# Patient Record
Sex: FEMALE | Race: BLACK OR AFRICAN AMERICAN
[De-identification: names, ages, dates, MRNs, and addresses within clinical notes are randomized per-mention and may not be internally consistent; named-entity substitution may affect disease eponyms.]

---

## 2017-01-16 ENCOUNTER — HOSPITAL ENCOUNTER (EMERGENCY)
Dept: HOSPITAL 19 - COL.ER | Age: 57
LOS: 1 days | Discharge: HOME | End: 2017-01-17
Payer: OTHER GOVERNMENT

## 2017-01-16 VITALS — BODY MASS INDEX: 27.39 KG/M2 | WEIGHT: 170.42 LBS | HEIGHT: 65.98 IN

## 2017-01-16 VITALS — DIASTOLIC BLOOD PRESSURE: 108 MMHG | TEMPERATURE: 98.1 F | SYSTOLIC BLOOD PRESSURE: 156 MMHG

## 2017-01-16 DIAGNOSIS — K52.9: Primary | ICD-10-CM

## 2017-01-16 LAB
ADJUSTED CALCIUM: 9.6 MG/DL (ref 8.4–10.2)
ALBUMIN SERPL-MCNC: 4.6 GM/DL (ref 3.5–5)
ALP SERPL-CCNC: 77 U/L (ref 50–136)
ALT SERPL-CCNC: 34 U/L (ref 9–52)
ANION GAP SERPL CALC-SCNC: 12 MMOL/L (ref 7–16)
BASOPHILS # BLD: 0 10*3/UL (ref 0–0.2)
BASOPHILS NFR BLD AUTO: 0.2 % (ref 0–2)
BILIRUB SERPL-MCNC: 0.9 MG/DL (ref 0–1)
BUN SERPL-MCNC: 16 MG/DL (ref 7–17)
CALCIUM SERPL-MCNC: 10.1 MG/DL (ref 8.4–10.2)
CHLORIDE SERPL-SCNC: 103 MMOL/L (ref 98–107)
CO2 SERPL-SCNC: 26 MMOL/L (ref 22–30)
CREAT SERPL-SCNC: 0.84 MG/DL (ref 0.52–1.25)
EOSINOPHIL # BLD: 0.1 10*3/UL (ref 0–0.7)
EOSINOPHIL NFR BLD: 0.4 % (ref 0–4)
ERYTHROCYTE [DISTWIDTH] IN BLOOD BY AUTOMATED COUNT: 14.4 % (ref 11.5–14.5)
GLUCOSE SERPL-MCNC: 143 MG/DL (ref 74–106)
GRANULOCYTES # BLD AUTO: 80.3 % (ref 42.2–75.2)
HCT VFR BLD AUTO: 37.8 % (ref 37–47)
HGB BLD-MCNC: 13.6 G/DL (ref 12.5–16)
LIPASE SERPL-CCNC: 87 U/L (ref 23–300)
LYMPHOCYTES # BLD: 1.5 10*3/UL (ref 1.2–3.4)
LYMPHOCYTES NFR BLD: 12.5 % (ref 20–51)
MCH RBC QN AUTO: 31 PG (ref 27–31)
MCHC RBC AUTO-ENTMCNC: 36 G/DL (ref 33–37)
MCV RBC AUTO: 85 FL (ref 80–100)
MONOCYTES # BLD: 0.8 10*3/UL (ref 0.1–0.6)
MONOCYTES NFR BLD AUTO: 6.3 % (ref 1.7–9.3)
NEUTROPHILS # BLD: 9.6 10*3/UL (ref 1.4–6.5)
PH UR STRIP.AUTO: 5 [PH] (ref 5–8)
PLATELET # BLD AUTO: 227 K/MM3 (ref 130–400)
PMV BLD AUTO: 9.7 FL (ref 7.4–10.4)
POTASSIUM SERPL-SCNC: 4.2 MMOL/L (ref 3.4–5)
PROT SERPL-MCNC: 9.4 GM/DL (ref 6.4–8.2)
RBC # BLD AUTO: 4.44 M/MM3 (ref 4.1–5.3)
RBC # UR: (no result) /HPF
SODIUM SERPL-SCNC: 141 MMOL/L (ref 137–145)
SP GR UR STRIP.AUTO: 1.03 (ref 1–1.03)
TROPONIN I SERPL-MCNC: < 0.012 NG/ML (ref 0–0.03)
WBC # BLD AUTO: 11.9 K/MM3 (ref 4.8–10.8)
WBC #/AREA URNS HPF: (no result) /HPF

## 2017-01-17 VITALS — HEART RATE: 82 BPM

## 2017-04-30 ENCOUNTER — HOSPITAL ENCOUNTER (EMERGENCY)
Dept: HOSPITAL 19 - COL.ER | Age: 57
Discharge: HOME | End: 2017-04-30
Payer: OTHER GOVERNMENT

## 2017-04-30 VITALS — SYSTOLIC BLOOD PRESSURE: 137 MMHG | DIASTOLIC BLOOD PRESSURE: 90 MMHG

## 2017-04-30 VITALS — BODY MASS INDEX: 26.4 KG/M2 | WEIGHT: 164.24 LBS | HEIGHT: 65.98 IN

## 2017-04-30 VITALS — TEMPERATURE: 98 F | HEART RATE: 93 BPM

## 2017-04-30 DIAGNOSIS — I10: ICD-10-CM

## 2017-04-30 DIAGNOSIS — K52.9: Primary | ICD-10-CM

## 2017-04-30 LAB
ADJUSTED CALCIUM: 9.7 MG/DL (ref 8.4–10.2)
ALBUMIN SERPL-MCNC: 4.6 GM/DL (ref 3.5–5)
ALP SERPL-CCNC: 74 U/L (ref 50–136)
ALT SERPL-CCNC: 27 U/L (ref 9–52)
ANION GAP SERPL CALC-SCNC: 14 MMOL/L (ref 7–16)
BASOPHILS # BLD: 0 10*3/UL (ref 0–0.2)
BASOPHILS NFR BLD AUTO: 0.1 % (ref 0–2)
BILIRUB SERPL-MCNC: 1 MG/DL (ref 0–1)
BUN SERPL-MCNC: 12 MG/DL (ref 7–17)
CALCIUM SERPL-MCNC: 10.2 MG/DL (ref 8.4–10.2)
CHLORIDE SERPL-SCNC: 102 MMOL/L (ref 98–107)
CO2 SERPL-SCNC: 27 MMOL/L (ref 22–30)
CREAT SERPL-SCNC: 0.75 MG/DL (ref 0.52–1.25)
CRP SERPL-MCNC: 0.8 MG/DL (ref 0–0.9)
EOSINOPHIL # BLD: 0 10*3/UL (ref 0–0.7)
EOSINOPHIL NFR BLD: 0.3 % (ref 0–4)
ERYTHROCYTE [DISTWIDTH] IN BLOOD BY AUTOMATED COUNT: 15.3 % (ref 11.5–14.5)
GLUCOSE SERPL-MCNC: 100 MG/DL (ref 74–106)
GRANULOCYTES # BLD AUTO: 65.4 % (ref 42.2–75.2)
HCT VFR BLD AUTO: 34 % (ref 37–47)
HGB BLD-MCNC: 12.3 G/DL (ref 12.5–16)
LIPASE SERPL-CCNC: 78 U/L (ref 23–300)
LYMPHOCYTES # BLD: 1.7 10*3/UL (ref 1.2–3.4)
LYMPHOCYTES NFR BLD: 25.9 % (ref 20–51)
MCH RBC QN AUTO: 31 PG (ref 27–31)
MCHC RBC AUTO-ENTMCNC: 36 G/DL (ref 33–37)
MCV RBC AUTO: 84 FL (ref 80–100)
MONOCYTES # BLD: 0.5 10*3/UL (ref 0.1–0.6)
MONOCYTES NFR BLD AUTO: 8 % (ref 1.7–9.3)
NEUTROPHILS # BLD: 4.4 10*3/UL (ref 1.4–6.5)
PH UR STRIP.AUTO: 5 [PH] (ref 5–8)
PLATELET # BLD AUTO: 224 K/MM3 (ref 130–400)
PMV BLD AUTO: 10.2 FL (ref 7.4–10.4)
POTASSIUM SERPL-SCNC: 4 MMOL/L (ref 3.4–5)
PROT SERPL-MCNC: 9 GM/DL (ref 6.4–8.2)
RBC # BLD AUTO: 4.03 M/MM3 (ref 4.1–5.3)
RBC # UR: (no result) /HPF
SODIUM SERPL-SCNC: 143 MMOL/L (ref 137–145)
SP GR UR STRIP.AUTO: 1.02 (ref 1–1.03)
SQUAMOUS # URNS: (no result) /HPF
TROPONIN I SERPL-MCNC: < 0.012 NG/ML (ref 0–0.03)
WBC # BLD AUTO: 6.7 K/MM3 (ref 4.8–10.8)
WBC #/AREA URNS HPF: (no result) /HPF

## 2018-02-08 ENCOUNTER — HOSPITAL ENCOUNTER (EMERGENCY)
Dept: HOSPITAL 19 - COL.ER | Age: 58
Discharge: HOME | End: 2018-02-08
Payer: OTHER GOVERNMENT

## 2018-02-08 VITALS — WEIGHT: 180.34 LBS | BODY MASS INDEX: 28.98 KG/M2 | HEIGHT: 65.98 IN

## 2018-02-08 VITALS — DIASTOLIC BLOOD PRESSURE: 88 MMHG | SYSTOLIC BLOOD PRESSURE: 137 MMHG

## 2018-02-08 VITALS — TEMPERATURE: 98.7 F

## 2018-02-08 VITALS — HEART RATE: 91 BPM

## 2018-02-08 DIAGNOSIS — K21.9: ICD-10-CM

## 2018-02-08 DIAGNOSIS — Z87.39: ICD-10-CM

## 2018-02-08 DIAGNOSIS — J40: Primary | ICD-10-CM

## 2018-02-08 DIAGNOSIS — I10: ICD-10-CM

## 2018-02-08 LAB
ALBUMIN SERPL-MCNC: 4.5 GM/DL (ref 3.5–5)
ALP SERPL-CCNC: 82 U/L (ref 50–136)
ALT SERPL-CCNC: 36 U/L (ref 9–52)
ANION GAP SERPL CALC-SCNC: 8 MMOL/L (ref 7–16)
AST SERPL-CCNC: 29 U/L (ref 15–37)
BASOPHILS # BLD: 0 10*3/UL (ref 0–0.2)
BASOPHILS NFR BLD AUTO: 0.2 % (ref 0–2)
BILIRUB SERPL-MCNC: 0.3 MG/DL (ref 0–1)
BUN SERPL-MCNC: 11 MG/DL (ref 7–17)
CALCIUM SERPL-MCNC: 9.9 MG/DL (ref 8.4–10.2)
CHLORIDE SERPL-SCNC: 104 MMOL/L (ref 98–107)
CO2 SERPL-SCNC: 29 MMOL/L (ref 22–30)
CREAT SERPL-SCNC: 0.8 MG/DL (ref 0.52–1.25)
CRP SERPL-MCNC: 0.9 MG/DL (ref 0–0.9)
EOSINOPHIL # BLD: 0.1 10*3/UL (ref 0–0.7)
EOSINOPHIL NFR BLD: 1.1 % (ref 0–4)
ERYTHROCYTE [DISTWIDTH] IN BLOOD BY AUTOMATED COUNT: 15.1 % (ref 11.5–14.5)
GLUCOSE SERPL-MCNC: 86 MG/DL (ref 74–106)
GRANULOCYTES # BLD AUTO: 63.8 % (ref 42.2–75.2)
HCT VFR BLD AUTO: 31.9 % (ref 37–47)
HGB BLD-MCNC: 11.4 G/DL (ref 12.5–16)
LIPASE SERPL-CCNC: 142 U/L (ref 23–300)
LYMPHOCYTES # BLD: 1.6 10*3/UL (ref 1.2–3.4)
LYMPHOCYTES NFR BLD: 24.9 % (ref 20–51)
MCH RBC QN AUTO: 31 PG (ref 27–31)
MCHC RBC AUTO-ENTMCNC: 36 G/DL (ref 33–37)
MCV RBC AUTO: 86 FL (ref 80–100)
MONOCYTES # BLD: 0.6 10*3/UL (ref 0.1–0.6)
MONOCYTES NFR BLD AUTO: 9.5 % (ref 1.7–9.3)
NEUTROPHILS # BLD: 4.1 10*3/UL (ref 1.4–6.5)
PLATELET # BLD AUTO: 204 K/MM3 (ref 130–400)
PMV BLD AUTO: 10.2 FL (ref 7.4–10.4)
POTASSIUM SERPL-SCNC: 3.8 MMOL/L (ref 3.4–5)
PROT SERPL-MCNC: 8.9 GM/DL (ref 6.4–8.2)
RBC # BLD AUTO: 3.73 M/MM3 (ref 4.1–5.3)
SODIUM SERPL-SCNC: 141 MMOL/L (ref 137–145)
TROPONIN I SERPL-MCNC: < 0.012 NG/ML (ref 0–0.03)

## 2018-04-27 ENCOUNTER — HOSPITAL ENCOUNTER (OUTPATIENT)
Dept: HOSPITAL 19 - COL.RAD | Age: 58
End: 2018-04-27
Payer: OTHER GOVERNMENT

## 2018-04-27 DIAGNOSIS — D47.2: Primary | ICD-10-CM

## 2018-06-21 ENCOUNTER — HOSPITAL ENCOUNTER (EMERGENCY)
Dept: HOSPITAL 19 - COL.ER | Age: 58
LOS: 1 days | Discharge: HOME | End: 2018-06-22
Payer: OTHER GOVERNMENT

## 2018-06-21 VITALS — WEIGHT: 170.42 LBS | HEIGHT: 65.98 IN | BODY MASS INDEX: 27.39 KG/M2

## 2018-06-21 VITALS — TEMPERATURE: 98.5 F

## 2018-06-21 DIAGNOSIS — Z90.49: ICD-10-CM

## 2018-06-21 DIAGNOSIS — K52.9: Primary | ICD-10-CM

## 2018-06-22 VITALS — HEART RATE: 79 BPM | DIASTOLIC BLOOD PRESSURE: 70 MMHG | SYSTOLIC BLOOD PRESSURE: 118 MMHG

## 2018-06-22 LAB
ALBUMIN SERPL-MCNC: 4.2 GM/DL (ref 3.5–5)
ALP SERPL-CCNC: 71 U/L (ref 50–136)
ALT SERPL-CCNC: 25 U/L (ref 9–52)
ANION GAP SERPL CALC-SCNC: 11 MMOL/L (ref 7–16)
AST SERPL-CCNC: 24 U/L (ref 15–37)
BASOPHILS # BLD: 0 10*3/UL (ref 0–0.2)
BASOPHILS NFR BLD AUTO: 0.1 % (ref 0–2)
BILIRUB SERPL-MCNC: 0.4 MG/DL (ref 0–1)
BUN SERPL-MCNC: 16 MG/DL (ref 7–17)
CALCIUM SERPL-MCNC: 9.5 MG/DL (ref 8.4–10.2)
CHLORIDE SERPL-SCNC: 101 MMOL/L (ref 98–107)
CO2 SERPL-SCNC: 28 MMOL/L (ref 22–30)
CREAT SERPL-SCNC: 0.75 MG/DL (ref 0.52–1.25)
CRP SERPL-MCNC: 0.6 MG/DL (ref 0–0.9)
EOSINOPHIL # BLD: 0 10*3/UL (ref 0–0.7)
EOSINOPHIL NFR BLD: 0.1 % (ref 0–4)
ERYTHROCYTE [DISTWIDTH] IN BLOOD BY AUTOMATED COUNT: 15.5 % (ref 11.5–14.5)
GLUCOSE SERPL-MCNC: 113 MG/DL (ref 74–106)
GRANULOCYTES # BLD AUTO: 82.1 % (ref 42.2–75.2)
HCT VFR BLD AUTO: 34.6 % (ref 37–47)
HGB BLD-MCNC: 12.8 G/DL (ref 12.5–16)
LIPASE SERPL-CCNC: 73 U/L (ref 23–300)
LYMPHOCYTES # BLD: 1 10*3/UL (ref 1.2–3.4)
LYMPHOCYTES NFR BLD: 10.5 % (ref 20–51)
MCH RBC QN AUTO: 31 PG (ref 27–31)
MCHC RBC AUTO-ENTMCNC: 37 G/DL (ref 33–37)
MCV RBC AUTO: 85 FL (ref 80–100)
MONOCYTES # BLD: 0.7 10*3/UL (ref 0.1–0.6)
MONOCYTES NFR BLD AUTO: 7 % (ref 1.7–9.3)
NEUTROPHILS # BLD: 7.8 10*3/UL (ref 1.4–6.5)
PLATELET # BLD AUTO: 175 K/MM3 (ref 130–400)
PMV BLD AUTO: 9.4 FL (ref 7.4–10.4)
POTASSIUM SERPL-SCNC: 3.9 MMOL/L (ref 3.4–5)
PROT SERPL-MCNC: 9.4 GM/DL (ref 6.4–8.2)
RBC # BLD AUTO: 4.07 M/MM3 (ref 4.1–5.3)
SODIUM SERPL-SCNC: 140 MMOL/L (ref 137–145)

## 2018-10-31 ENCOUNTER — HOSPITAL ENCOUNTER (EMERGENCY)
Dept: HOSPITAL 19 - COL.ER | Age: 58
Discharge: HOME | End: 2018-10-31
Payer: OTHER GOVERNMENT

## 2018-10-31 VITALS — TEMPERATURE: 98.6 F

## 2018-10-31 VITALS — DIASTOLIC BLOOD PRESSURE: 91 MMHG | SYSTOLIC BLOOD PRESSURE: 137 MMHG | HEART RATE: 88 BPM

## 2018-10-31 VITALS — WEIGHT: 189.38 LBS | HEIGHT: 65.98 IN | BODY MASS INDEX: 30.44 KG/M2

## 2018-10-31 DIAGNOSIS — I10: ICD-10-CM

## 2018-10-31 DIAGNOSIS — R10.31: Primary | ICD-10-CM

## 2018-10-31 DIAGNOSIS — E78.5: ICD-10-CM

## 2018-10-31 DIAGNOSIS — Z87.2: ICD-10-CM

## 2018-10-31 DIAGNOSIS — R05: ICD-10-CM

## 2018-10-31 LAB
ALBUMIN SERPL-MCNC: 4.4 GM/DL (ref 3.5–5)
ALP SERPL-CCNC: 71 U/L (ref 50–136)
ALT SERPL-CCNC: 31 U/L (ref 9–52)
ANION GAP SERPL CALC-SCNC: 5 MMOL/L (ref 7–16)
AST SERPL-CCNC: 26 U/L (ref 15–37)
BASOPHILS # BLD: 0 10*3/UL (ref 0–0.2)
BASOPHILS NFR BLD AUTO: 0.1 % (ref 0–2)
BILIRUB SERPL-MCNC: 0.4 MG/DL (ref 0–1)
BUN SERPL-MCNC: 8 MG/DL (ref 7–17)
CALCIUM SERPL-MCNC: 9.6 MG/DL (ref 8.4–10.2)
CHLORIDE SERPL-SCNC: 106 MMOL/L (ref 98–107)
CO2 SERPL-SCNC: 31 MMOL/L (ref 22–30)
CREAT SERPL-SCNC: 0.76 MG/DL (ref 0.52–1.25)
CRP SERPL-MCNC: 0.7 MG/DL (ref 0–0.9)
EOSINOPHIL # BLD: 0 10*3/UL (ref 0–0.7)
EOSINOPHIL NFR BLD: 0.6 % (ref 0–4)
ERYTHROCYTE [DISTWIDTH] IN BLOOD BY AUTOMATED COUNT: 15 % (ref 11.5–14.5)
GLUCOSE SERPL-MCNC: 100 MG/DL (ref 74–106)
GRANULOCYTES # BLD AUTO: 63.1 % (ref 42.2–75.2)
HCT VFR BLD AUTO: 33.6 % (ref 37–47)
HGB BLD-MCNC: 12 G/DL (ref 12.5–16)
LIPASE SERPL-CCNC: 106 U/L (ref 23–300)
LYMPHOCYTES # BLD: 1.5 10*3/UL (ref 1.2–3.4)
LYMPHOCYTES NFR BLD: 22.7 % (ref 20–51)
MCH RBC QN AUTO: 31 PG (ref 27–31)
MCHC RBC AUTO-ENTMCNC: 36 G/DL (ref 33–37)
MCV RBC AUTO: 86 FL (ref 80–100)
MONOCYTES # BLD: 0.9 10*3/UL (ref 0.1–0.6)
MONOCYTES NFR BLD AUTO: 13.2 % (ref 1.7–9.3)
NEUTROPHILS # BLD: 4.2 10*3/UL (ref 1.4–6.5)
PH UR STRIP.AUTO: 7 [PH] (ref 5–8)
PLATELET # BLD AUTO: 172 K/MM3 (ref 130–400)
PMV BLD AUTO: 10 FL (ref 7.4–10.4)
POTASSIUM SERPL-SCNC: 3.9 MMOL/L (ref 3.4–5)
PROT SERPL-MCNC: 8.7 GM/DL (ref 6.4–8.2)
RBC # BLD AUTO: 3.91 M/MM3 (ref 4.1–5.3)
RBC # UR: (no result) /HPF
SODIUM SERPL-SCNC: 142 MMOL/L (ref 137–145)
SP GR UR STRIP.AUTO: 1.01 (ref 1–1.03)
UA DIPSTICK PNL UR STRIP.AUTO: (no result)
URN COLLECT METHOD CLASS: (no result)
UROBILINOGEN UR STRIP.AUTO-MCNC: >=4 MG/DL

## 2019-04-03 ENCOUNTER — HOSPITAL ENCOUNTER (OUTPATIENT)
Dept: HOSPITAL 19 - COL.RAD | Age: 59
End: 2019-04-03
Payer: OTHER GOVERNMENT

## 2019-04-03 DIAGNOSIS — I10: Primary | ICD-10-CM

## 2019-11-05 ENCOUNTER — HOSPITAL ENCOUNTER (INPATIENT)
Dept: HOSPITAL 19 - COL.ER | Age: 59
LOS: 2 days | Discharge: HOME | DRG: 641 | End: 2019-11-07
Attending: STUDENT IN AN ORGANIZED HEALTH CARE EDUCATION/TRAINING PROGRAM | Admitting: STUDENT IN AN ORGANIZED HEALTH CARE EDUCATION/TRAINING PROGRAM
Payer: OTHER GOVERNMENT

## 2019-11-05 VITALS — DIASTOLIC BLOOD PRESSURE: 49 MMHG | HEART RATE: 117 BPM | TEMPERATURE: 99.7 F | SYSTOLIC BLOOD PRESSURE: 114 MMHG

## 2019-11-05 VITALS — OXYGEN SATURATION: 100 %

## 2019-11-05 VITALS — DIASTOLIC BLOOD PRESSURE: 60 MMHG | TEMPERATURE: 98.3 F | SYSTOLIC BLOOD PRESSURE: 99 MMHG | HEART RATE: 90 BPM

## 2019-11-05 VITALS — SYSTOLIC BLOOD PRESSURE: 106 MMHG | DIASTOLIC BLOOD PRESSURE: 76 MMHG | TEMPERATURE: 99.9 F | HEART RATE: 120 BPM

## 2019-11-05 VITALS — OXYGEN SATURATION: 94 %

## 2019-11-05 VITALS — WEIGHT: 162.48 LBS | BODY MASS INDEX: 26.11 KG/M2 | HEIGHT: 65.98 IN

## 2019-11-05 VITALS — OXYGEN SATURATION: 99 %

## 2019-11-05 VITALS — OXYGEN SATURATION: 85 %

## 2019-11-05 VITALS — OXYGEN SATURATION: 78 %

## 2019-11-05 VITALS — DIASTOLIC BLOOD PRESSURE: 60 MMHG | SYSTOLIC BLOOD PRESSURE: 99 MMHG | TEMPERATURE: 98.3 F | HEART RATE: 90 BPM

## 2019-11-05 VITALS — OXYGEN SATURATION: 96 %

## 2019-11-05 DIAGNOSIS — R55: ICD-10-CM

## 2019-11-05 DIAGNOSIS — T45.1X5A: ICD-10-CM

## 2019-11-05 DIAGNOSIS — Z88.2: ICD-10-CM

## 2019-11-05 DIAGNOSIS — E78.5: ICD-10-CM

## 2019-11-05 DIAGNOSIS — I95.9: ICD-10-CM

## 2019-11-05 DIAGNOSIS — Z90.710: ICD-10-CM

## 2019-11-05 DIAGNOSIS — Z79.82: ICD-10-CM

## 2019-11-05 DIAGNOSIS — D61.818: ICD-10-CM

## 2019-11-05 DIAGNOSIS — N17.9: ICD-10-CM

## 2019-11-05 DIAGNOSIS — C90.00: ICD-10-CM

## 2019-11-05 DIAGNOSIS — R05: ICD-10-CM

## 2019-11-05 DIAGNOSIS — I10: ICD-10-CM

## 2019-11-05 DIAGNOSIS — E16.2: Primary | ICD-10-CM

## 2019-11-05 DIAGNOSIS — N39.0: ICD-10-CM

## 2019-11-05 DIAGNOSIS — D64.9: ICD-10-CM

## 2019-11-05 DIAGNOSIS — M32.9: ICD-10-CM

## 2019-11-05 LAB
ALBUMIN SERPL-MCNC: 4.4 GM/DL (ref 3.5–5)
ALP SERPL-CCNC: 63 U/L (ref 50–136)
ALT SERPL-CCNC: 25 U/L (ref 9–52)
ANION GAP SERPL CALC-SCNC: 9 MMOL/L (ref 7–16)
AST SERPL-CCNC: 53 U/L (ref 15–37)
BASOPHILS # BLD: 0 10*3/UL (ref 0–0.2)
BASOPHILS NFR BLD AUTO: 0.2 % (ref 0–2)
BILIRUB SERPL-MCNC: 0.3 MG/DL (ref 0–1)
BUN SERPL-MCNC: 49 MG/DL (ref 7–17)
CALCIUM SERPL-MCNC: 9.8 MG/DL (ref 8.4–10.2)
CHLORIDE SERPL-SCNC: 101 MMOL/L (ref 98–107)
CO2 SERPL-SCNC: 26 MMOL/L (ref 22–30)
CREAT SERPL-SCNC: 2.22 UMOL/L (ref 0.52–1.25)
EOSINOPHIL # BLD: 0.1 10*3/UL (ref 0–0.7)
EOSINOPHIL NFR BLD: 1.1 % (ref 0–4)
ERYTHROCYTE [DISTWIDTH] IN BLOOD BY AUTOMATED COUNT: 15.1 % (ref 11.5–14.5)
GLUCOSE SERPL-MCNC: 55 MG/DL (ref 74–106)
GRANULOCYTES # BLD AUTO: 72.7 % (ref 42.2–75.2)
HCT VFR BLD AUTO: 25.7 % (ref 37–47)
HGB BLD-MCNC: 9.2 G/DL (ref 12.5–16)
HYALINE CASTS #/AREA URNS LPF: (no result) /LPF
LYMPHOCYTES # BLD: 1 10*3/UL (ref 1.2–3.4)
LYMPHOCYTES NFR BLD: 21.1 % (ref 20–51)
MCH RBC QN AUTO: 32 PG (ref 27–31)
MCHC RBC AUTO-ENTMCNC: 36 G/DL (ref 33–37)
MCV RBC AUTO: 90 FL (ref 80–100)
MONOCYTES # BLD: 0.2 10*3/UL (ref 0.1–0.6)
MONOCYTES NFR BLD AUTO: 4.5 % (ref 1.7–9.3)
NEUTROPHILS # BLD: 3.4 10*3/UL (ref 1.4–6.5)
PH UR STRIP.AUTO: 5 [PH] (ref 5–8)
PLATELET # BLD AUTO: 127 K/MM3 (ref 130–400)
PMV BLD AUTO: 10.5 FL (ref 7.4–10.4)
POTASSIUM SERPL-SCNC: 4.2 MMOL/L (ref 3.4–5)
PROT SERPL-MCNC: 8.6 GM/DL (ref 6.4–8.2)
RBC # BLD AUTO: 2.86 M/MM3 (ref 4.1–5.3)
RBC # UR: (no result) /HPF
SODIUM SERPL-SCNC: 137 MMOL/L (ref 137–145)
SP GR UR STRIP.AUTO: 1.01 (ref 1–1.03)
SQUAMOUS # URNS: (no result) /HPF
TROPONIN I SERPL-MCNC: < 0.012 NG/ML (ref 0–0.04)
URINE LEUKOCYTE ESTERASE: (no result)
URN COLLECT METHOD CLASS: (no result)

## 2019-11-05 PROCEDURE — A4216 STERILE WATER/SALINE, 10 ML: HCPCS

## 2019-11-05 NOTE — NUR
Pt BS rechecked, 57. Pt has had 5 juices, turkey sandwich, fruit. IVF running
w/ no complications. JAMEY Green updated on pt condition. Will recheck BS in 30
min.

## 2019-11-05 NOTE — NUR
Pt BS checked, resulting 50, D5 running at 50ml/hr. JAMEY Green notified. Will
consult w/ Hospitalist on POC.

## 2019-11-05 NOTE — NUR
BS rechecked, resulting 46. D5 fluids started at 50ml/hr. Pt sitting in bed,
eating, A&O, having conversation w/ staff.

## 2019-11-05 NOTE — NUR
Pt being transferred to [facility] room 2. Escorted via bed by SAM Zaragoza. Report
called to SAM Mcdonald. D10 at 50 ml/hr started prior to departure in [facility].

## 2019-11-05 NOTE — NUR
1820: PT ARRIVIED TO ICU RM 2.
1831: PT BS 46 - FOLLOWING HYPOGYCEMIC PROTOCOL
1832: REPORT RECEIVED FROM SAM MIRELES
1838: PT STATES SHE IS NOT HUNGRY, BUT RATHER FULL. PT STATES SHE WILL TRY TO
EAT SOME MORE FOOD.

## 2019-11-05 NOTE — NUR
Pt up to room 317, 5 page, med rec, allergies and pharmacy completed. Pt
laying in bed with  at bedside. Pt A&O, lung sounds clear, heart RRR,
bowel sounds present, pulses strong bilaterally. Pt denies SOB, is on room
air, denies N/V/D, chest pain palpitations. Pt states she has some dizziness.
Pt denies pain "worth complaining about". Pt started chemo 11/1/19 and today
is the first day she "didn't feel good". Pt ambulatory in room. No other
concerns voiced at this time.

## 2019-11-05 NOTE — NUR
Patient resting in bed; accu check 120.  Denies any pain, dizziness. or
shortness of breath. Only reports feeling "tired".  Ambulated to bathroom with
standby assist only.  No concerns at this time; will continue to monitor.

## 2019-11-05 NOTE — NUR
Pt ordered for Glucose check now, this nurse checking BS as JAMEY Green was
walking in for assessment. BS resulted 25. Pt is A&O, talking w/ staff.
JAMEY Green grabbed juice and crackers. Pt drank 3 juices. BS rechecked 3 more
times, resulting 12, LO (too low to read), and LO. Verbal order D50 syringe
ordered, administered 25 ml, BS rechecked 43. Stat blood order for glucose
check ordered. Pt getting labs drawn at this time. Will recheck blood sugar in
15 min. Pt has appeared asymptomatic. Discussed POC w/ JAMEY Green.

## 2019-11-06 VITALS — OXYGEN SATURATION: 100 %

## 2019-11-06 VITALS — TEMPERATURE: 100.5 F | HEART RATE: 110 BPM | SYSTOLIC BLOOD PRESSURE: 100 MMHG | DIASTOLIC BLOOD PRESSURE: 61 MMHG

## 2019-11-06 VITALS — HEART RATE: 92 BPM | TEMPERATURE: 97.9 F | DIASTOLIC BLOOD PRESSURE: 54 MMHG | SYSTOLIC BLOOD PRESSURE: 92 MMHG

## 2019-11-06 VITALS — OXYGEN SATURATION: 99 %

## 2019-11-06 VITALS — DIASTOLIC BLOOD PRESSURE: 52 MMHG | HEART RATE: 84 BPM | SYSTOLIC BLOOD PRESSURE: 95 MMHG

## 2019-11-06 VITALS — DIASTOLIC BLOOD PRESSURE: 53 MMHG | HEART RATE: 82 BPM | SYSTOLIC BLOOD PRESSURE: 93 MMHG

## 2019-11-06 VITALS — SYSTOLIC BLOOD PRESSURE: 93 MMHG | DIASTOLIC BLOOD PRESSURE: 56 MMHG | HEART RATE: 85 BPM

## 2019-11-06 VITALS — HEART RATE: 95 BPM | DIASTOLIC BLOOD PRESSURE: 55 MMHG | TEMPERATURE: 98.8 F | SYSTOLIC BLOOD PRESSURE: 85 MMHG

## 2019-11-06 VITALS — SYSTOLIC BLOOD PRESSURE: 89 MMHG | DIASTOLIC BLOOD PRESSURE: 55 MMHG | TEMPERATURE: 98.7 F | HEART RATE: 81 BPM

## 2019-11-06 VITALS — HEART RATE: 81 BPM | SYSTOLIC BLOOD PRESSURE: 93 MMHG | DIASTOLIC BLOOD PRESSURE: 57 MMHG

## 2019-11-06 VITALS — HEART RATE: 90 BPM | DIASTOLIC BLOOD PRESSURE: 54 MMHG | SYSTOLIC BLOOD PRESSURE: 94 MMHG

## 2019-11-06 VITALS — TEMPERATURE: 97.7 F | SYSTOLIC BLOOD PRESSURE: 107 MMHG | DIASTOLIC BLOOD PRESSURE: 82 MMHG | HEART RATE: 99 BPM

## 2019-11-06 VITALS — HEART RATE: 82 BPM | DIASTOLIC BLOOD PRESSURE: 51 MMHG | SYSTOLIC BLOOD PRESSURE: 94 MMHG

## 2019-11-06 VITALS — SYSTOLIC BLOOD PRESSURE: 103 MMHG | TEMPERATURE: 98 F | HEART RATE: 94 BPM | DIASTOLIC BLOOD PRESSURE: 60 MMHG

## 2019-11-06 VITALS — TEMPERATURE: 98.3 F | HEART RATE: 88 BPM | SYSTOLIC BLOOD PRESSURE: 112 MMHG | DIASTOLIC BLOOD PRESSURE: 69 MMHG

## 2019-11-06 VITALS — HEART RATE: 93 BPM | SYSTOLIC BLOOD PRESSURE: 89 MMHG | DIASTOLIC BLOOD PRESSURE: 55 MMHG

## 2019-11-06 VITALS — SYSTOLIC BLOOD PRESSURE: 94 MMHG | DIASTOLIC BLOOD PRESSURE: 57 MMHG

## 2019-11-06 LAB
ALBUMIN SERPL-MCNC: 3.5 GM/DL (ref 3.5–5)
ALP SERPL-CCNC: 54 U/L (ref 50–136)
ALT SERPL-CCNC: 22 U/L (ref 9–52)
ANION GAP SERPL CALC-SCNC: 5 MMOL/L (ref 7–16)
ANISOCYTOSIS BLD QL: (no result)
AST SERPL-CCNC: 23 U/L (ref 15–37)
BILIRUB SERPL-MCNC: 0.1 MG/DL (ref 0–1)
BUN SERPL-MCNC: 20 MG/DL (ref 7–17)
CALCIUM SERPL-MCNC: 8.9 MG/DL (ref 8.4–10.2)
CHLORIDE SERPL-SCNC: 109 MMOL/L (ref 98–107)
CO2 SERPL-SCNC: 26 MMOL/L (ref 22–30)
CREAT SERPL-SCNC: 1.13 UMOL/L (ref 0.52–1.25)
ERYTHROCYTE [DISTWIDTH] IN BLOOD BY AUTOMATED COUNT: 15.6 % (ref 11.5–14.5)
GLUCOSE SERPL-MCNC: 127 MG/DL (ref 74–106)
HCT VFR BLD AUTO: 24.3 % (ref 37–47)
HGB BLD-MCNC: 8.7 G/DL (ref 12.5–16)
LYMPHOCYTES NFR BLD MANUAL: 43 % (ref 20–51)
MCH RBC QN AUTO: 33 PG (ref 27–31)
MCHC RBC AUTO-ENTMCNC: 36 G/DL (ref 33–37)
MCV RBC AUTO: 91 FL (ref 80–100)
MONOCYTES NFR BLD: 7 % (ref 1.7–9.3)
NEUTS BAND NFR BLD: 1 % (ref 0–10)
NEUTS SEG NFR BLD MANUAL: 49 % (ref 42–75.2)
PLATELET # BLD AUTO: 97 K/MM3 (ref 130–400)
PLATELET BLD QL SMEAR: (no result)
PMV BLD AUTO: 10.9 FL (ref 7.4–10.4)
POIKILOCYTOSIS BLD QL SMEAR: (no result)
POTASSIUM SERPL-SCNC: 4.1 MMOL/L (ref 3.4–5)
PROT SERPL-MCNC: 7.3 GM/DL (ref 6.4–8.2)
RBC # BLD AUTO: 2.66 M/MM3 (ref 4.1–5.3)
SODIUM SERPL-SCNC: 140 MMOL/L (ref 137–145)
TARGETS BLD QL SMEAR: (no result)
TROPONIN I SERPL-MCNC: < 0.012 NG/ML (ref 0–0.04)

## 2019-11-06 NOTE — NUR
0405[facility] and FVS obtained at this
time. No new orders received. To continue to monitor and finish NS bolus.

## 2019-11-06 NOTE — NUR
SW met with the patient to discuss discharge plan. The patient lives in
[place] with her , Jorge (ph#[phone]). She reports
independence with ADLs and does not have any DME. The patient's PCP is Dr. Mahajan at [facility] and she also receives her medications at [facility]. She reports no
difficulties obtaining her meds. The patient does not have advanced directives
in EMR, but she states that she does have them completed and at home. She
states her DPOA-HC is her . The patient plans to return home with her
 upon discharge. No additional needs at this time, but SW to continue
to follow.

## 2019-11-06 NOTE — NUR
0250: Patient received from ICu. A/O x 4. No complaints or concerns. Denies
pain or discomfort. See flowsheet for fvs obtained with hypotension and
tachycardia. blood glucose 123. Patient Denies feeling dizzy, lightheaded, or
weak. States "I don't really even feel sick". Skin warm and moist to touch.
Telemetry indicates sinus tachycardia. Chart reviewed. Rocephin given for
suspected UTI. urine and blood cultures pending. aMmi DELGADO notified.
Orders received for NS 1000ml bolus over 1 hr. to continue Dextrose infusion
during bolus. Patient updated on plan of care and reason, agrees with plan of
care and has no questions or concerns after discussion.

## 2019-11-07 VITALS — TEMPERATURE: 97.8 F | HEART RATE: 81 BPM | DIASTOLIC BLOOD PRESSURE: 78 MMHG | SYSTOLIC BLOOD PRESSURE: 125 MMHG

## 2019-11-07 VITALS — DIASTOLIC BLOOD PRESSURE: 67 MMHG | TEMPERATURE: 98.2 F | SYSTOLIC BLOOD PRESSURE: 111 MMHG | HEART RATE: 100 BPM

## 2019-11-07 VITALS — HEART RATE: 89 BPM | SYSTOLIC BLOOD PRESSURE: 111 MMHG | DIASTOLIC BLOOD PRESSURE: 65 MMHG | TEMPERATURE: 98.4 F

## 2019-11-07 VITALS — SYSTOLIC BLOOD PRESSURE: 122 MMHG | TEMPERATURE: 97.9 F | DIASTOLIC BLOOD PRESSURE: 72 MMHG | HEART RATE: 89 BPM

## 2019-11-07 LAB
ANION GAP SERPL CALC-SCNC: 6 MMOL/L (ref 7–16)
BUN SERPL-MCNC: 15 MG/DL (ref 7–17)
C PEPTIDE [MASS OR MOLES/VOLUME] IN SERUM OR PLASMA: 6.68 NG/ML (ref 0.8–3.9)
CALCIUM SERPL-MCNC: 9.1 MG/DL (ref 8.4–10.2)
CHLORIDE SERPL-SCNC: 109 MMOL/L (ref 98–107)
CO2 SERPL-SCNC: 24 MMOL/L (ref 22–30)
CREAT SERPL-SCNC: 1.06 UMOL/L (ref 0.52–1.25)
EOSINOPHIL NFR BLD: 4 % (ref 0–4)
ERYTHROCYTE [DISTWIDTH] IN BLOOD BY AUTOMATED COUNT: 15.8 % (ref 11.5–14.5)
GLUCOSE SERPL-MCNC: 117 MG/DL (ref 74–106)
HCT VFR BLD AUTO: 23.6 % (ref 37–47)
HGB BLD-MCNC: 8.3 G/DL (ref 12.5–16)
HYPOCHROMIA BLD QL SMEAR: (no result)
INSULIN SERPL-ACNC: 8 UIU/ML (ref 2–23)
LYMPHOCYTES NFR BLD MANUAL: 35 % (ref 20–51)
MCH RBC QN AUTO: 32 PG (ref 27–31)
MCHC RBC AUTO-ENTMCNC: 35 G/DL (ref 33–37)
MCV RBC AUTO: 92 FL (ref 80–100)
MONOCYTES NFR BLD: 1 % (ref 1.7–9.3)
NEUTS SEG NFR BLD MANUAL: 60 % (ref 42–75.2)
PATH REV BLD -IMP: (no result)
PLATELET # BLD AUTO: 81 K/MM3 (ref 130–400)
PLATELET BLD QL SMEAR: (no result)
PMV BLD AUTO: 11.5 FL (ref 7.4–10.4)
POTASSIUM SERPL-SCNC: 4.5 MMOL/L (ref 3.4–5)
RBC # BLD AUTO: 2.57 M/MM3 (ref 4.1–5.3)
SCHISTOCYTES BLD QL SMEAR: (no result)
SODIUM SERPL-SCNC: 139 MMOL/L (ref 137–145)
TARGETS BLD QL SMEAR: (no result)

## 2019-11-07 NOTE — NUR
PT DISCHARGE EDUCATION WAS PROVIDED. PT RECIEVED HOME MEDS FROM PHARMACY.
TELE REMOVED. STATED THAT SHE WAS WAITING TO HEAR BACK FROM  TO TAKE
HER HOME, INFORMED PT THAT SHE COULD STAY IN ROOM AND WAIT THAT WOULD BE FINE
AND THAT SHE DIDNT NEED TO WAIT BY THE FRONT DOORS AS SHE THOUGHT. ALL
QUESTIONS ADDRESSED THIS AM WITH PROVIDER AND APRN.

## 2019-11-07 NOTE — NUR
IV SITE LOST THIS AM, WAS LEAKING. IV REMOVED WITHOUT ISSUE. WILL INQUIRE
ABOUT THE NEED TO RESTART IV PRIOR TO ATTEMPTING.

## 2019-11-08 ENCOUNTER — HOSPITAL ENCOUNTER (INPATIENT)
Dept: HOSPITAL 19 - COL.ER | Age: 59
LOS: 18 days | Discharge: OTHER INSTITUTION | DRG: 870 | End: 2019-11-26
Attending: STUDENT IN AN ORGANIZED HEALTH CARE EDUCATION/TRAINING PROGRAM | Admitting: STUDENT IN AN ORGANIZED HEALTH CARE EDUCATION/TRAINING PROGRAM
Payer: OTHER GOVERNMENT

## 2019-11-08 VITALS — DIASTOLIC BLOOD PRESSURE: 60 MMHG | TEMPERATURE: 99.5 F | HEART RATE: 108 BPM | SYSTOLIC BLOOD PRESSURE: 124 MMHG

## 2019-11-08 VITALS — HEIGHT: 66 IN | WEIGHT: 162.92 LBS | BODY MASS INDEX: 26.18 KG/M2

## 2019-11-08 VITALS — DIASTOLIC BLOOD PRESSURE: 56 MMHG | SYSTOLIC BLOOD PRESSURE: 109 MMHG | HEART RATE: 107 BPM | TEMPERATURE: 99.4 F

## 2019-11-08 DIAGNOSIS — Z90.710: ICD-10-CM

## 2019-11-08 DIAGNOSIS — E87.3: ICD-10-CM

## 2019-11-08 DIAGNOSIS — E78.5: ICD-10-CM

## 2019-11-08 DIAGNOSIS — R65.21: ICD-10-CM

## 2019-11-08 DIAGNOSIS — I73.00: ICD-10-CM

## 2019-11-08 DIAGNOSIS — I21.A1: ICD-10-CM

## 2019-11-08 DIAGNOSIS — T45.1X5A: ICD-10-CM

## 2019-11-08 DIAGNOSIS — A04.72: ICD-10-CM

## 2019-11-08 DIAGNOSIS — E87.2: ICD-10-CM

## 2019-11-08 DIAGNOSIS — F41.9: ICD-10-CM

## 2019-11-08 DIAGNOSIS — T38.0X5A: ICD-10-CM

## 2019-11-08 DIAGNOSIS — Z90.49: ICD-10-CM

## 2019-11-08 DIAGNOSIS — D69.6: ICD-10-CM

## 2019-11-08 DIAGNOSIS — A41.9: Primary | ICD-10-CM

## 2019-11-08 DIAGNOSIS — E83.39: ICD-10-CM

## 2019-11-08 DIAGNOSIS — G72.9: ICD-10-CM

## 2019-11-08 DIAGNOSIS — J18.1: ICD-10-CM

## 2019-11-08 DIAGNOSIS — N39.0: ICD-10-CM

## 2019-11-08 DIAGNOSIS — J96.01: ICD-10-CM

## 2019-11-08 DIAGNOSIS — C90.00: ICD-10-CM

## 2019-11-08 DIAGNOSIS — E16.2: ICD-10-CM

## 2019-11-08 DIAGNOSIS — I10: ICD-10-CM

## 2019-11-08 DIAGNOSIS — D64.9: ICD-10-CM

## 2019-11-08 DIAGNOSIS — D61.810: ICD-10-CM

## 2019-11-08 DIAGNOSIS — Z98.51: ICD-10-CM

## 2019-11-08 DIAGNOSIS — E83.42: ICD-10-CM

## 2019-11-08 LAB
ALBUMIN SERPL-MCNC: 4.6 GM/DL (ref 3.5–5)
ALP SERPL-CCNC: 66 U/L (ref 50–136)
ALT SERPL-CCNC: 15 U/L (ref 9–52)
ANION GAP SERPL CALC-SCNC: 10 MMOL/L (ref 7–16)
ANISOCYTOSIS BLD QL: (no result)
AST SERPL-CCNC: 26 U/L (ref 15–37)
BILIRUB SERPL-MCNC: 0.2 MG/DL (ref 0–1)
BUN SERPL-MCNC: 12 MG/DL (ref 7–17)
CALCIUM SERPL-MCNC: 10.3 MG/DL (ref 8.4–10.2)
CHLORIDE SERPL-SCNC: 105 MMOL/L (ref 98–107)
CO2 SERPL-SCNC: 27 MMOL/L (ref 22–30)
CREAT SERPL-SCNC: 1.07 UMOL/L (ref 0.52–1.25)
EOSINOPHIL NFR BLD: 3 % (ref 0–4)
ERYTHROCYTE [DISTWIDTH] IN BLOOD BY AUTOMATED COUNT: 15.9 % (ref 11.5–14.5)
GLUCOSE SERPL-MCNC: 86 MG/DL (ref 74–106)
HCT VFR BLD AUTO: 28 % (ref 37–47)
HGB BLD-MCNC: 9.9 G/DL (ref 12.5–16)
INR BLD: 1.4 (ref 0.8–3)
LYMPHOCYTES NFR BLD MANUAL: 13 % (ref 20–51)
MCH RBC QN AUTO: 32 PG (ref 27–31)
MCHC RBC AUTO-ENTMCNC: 35 G/DL (ref 33–37)
MCV RBC AUTO: 91 FL (ref 80–100)
METAMYELOCYTES NFR BLD MANUAL: 2 % (ref 0–0)
MONOCYTES NFR BLD: 6 % (ref 1.7–9.3)
NEUTS BAND NFR BLD: 3 % (ref 0–10)
NEUTS SEG NFR BLD MANUAL: 73 % (ref 42–75.2)
PH UR STRIP.AUTO: 6 [PH] (ref 5–8)
PLATELET # BLD AUTO: 81 K/MM3 (ref 130–400)
PLATELET BLD QL SMEAR: (no result)
PMV BLD AUTO: 10.9 FL (ref 7.4–10.4)
POTASSIUM SERPL-SCNC: 4.5 MMOL/L (ref 3.4–5)
PROT SERPL-MCNC: 9.2 GM/DL (ref 6.4–8.2)
PROTHROMBIN TIME: 16.9 SECONDS (ref 9.7–12.8)
RBC # BLD AUTO: 3.08 M/MM3 (ref 4.1–5.3)
RBC # UR: (no result) /HPF
SALICYLATES SERPL-MCNC: < 1 MG/DL
SODIUM SERPL-SCNC: 141 MMOL/L (ref 137–145)
SP GR UR STRIP.AUTO: 1.02 (ref 1–1.03)
SQUAMOUS # URNS: (no result) /HPF
TARGETS BLD QL SMEAR: (no result)
TROPONIN I SERPL-MCNC: < 0.012 NG/ML (ref 0–0.04)
URN COLLECT METHOD CLASS: (no result)

## 2019-11-08 PROCEDURE — A9500 TC99M SESTAMIBI: HCPCS

## 2019-11-08 PROCEDURE — A4216 STERILE WATER/SALINE, 10 ML: HCPCS

## 2019-11-08 PROCEDURE — P9016 RBC LEUKOCYTES REDUCED: HCPCS

## 2019-11-08 PROCEDURE — C1751 CATH, INF, PER/CENT/MIDLINE: HCPCS

## 2019-11-08 NOTE — NUR
Admitted to room 357 from ER with fever, productive cough- HX multiple myloma,
temp at this time 99.5, Up in room , steady on feet- understands we need a
stool specimen and a sputum specimen,  went out to get her some food-
Mami DELGADO here talking with patient- IV fluids of NS at 250cc/hr, tele on,
denies pain-states has a low back ache but no pain,,

## 2019-11-09 VITALS — SYSTOLIC BLOOD PRESSURE: 113 MMHG | TEMPERATURE: 102.2 F | HEART RATE: 98 BPM | DIASTOLIC BLOOD PRESSURE: 68 MMHG

## 2019-11-09 VITALS — SYSTOLIC BLOOD PRESSURE: 123 MMHG | DIASTOLIC BLOOD PRESSURE: 72 MMHG | TEMPERATURE: 102.8 F | HEART RATE: 99 BPM

## 2019-11-09 VITALS — TEMPERATURE: 102.1 F | SYSTOLIC BLOOD PRESSURE: 120 MMHG | HEART RATE: 108 BPM | DIASTOLIC BLOOD PRESSURE: 66 MMHG

## 2019-11-09 VITALS — HEART RATE: 98 BPM | TEMPERATURE: 99.7 F | DIASTOLIC BLOOD PRESSURE: 57 MMHG | SYSTOLIC BLOOD PRESSURE: 111 MMHG

## 2019-11-09 VITALS — HEART RATE: 66 BPM | SYSTOLIC BLOOD PRESSURE: 113 MMHG | DIASTOLIC BLOOD PRESSURE: 66 MMHG | TEMPERATURE: 101.4 F

## 2019-11-09 VITALS — HEART RATE: 45 BPM | SYSTOLIC BLOOD PRESSURE: 98 MMHG | DIASTOLIC BLOOD PRESSURE: 59 MMHG | TEMPERATURE: 98.2 F

## 2019-11-09 LAB
ALBUMIN SERPL-MCNC: 3.2 GM/DL (ref 3.5–5)
ALP SERPL-CCNC: 43 U/L (ref 50–136)
ALT SERPL-CCNC: 17 U/L (ref 9–52)
ANION GAP SERPL CALC-SCNC: 6 MMOL/L (ref 7–16)
ANISOCYTOSIS BLD QL: (no result)
AST SERPL-CCNC: 22 U/L (ref 15–37)
BILIRUB SERPL-MCNC: 0.2 MG/DL (ref 0–1)
BUN SERPL-MCNC: 9 MG/DL (ref 7–17)
CALCIUM SERPL-MCNC: 7.9 MG/DL (ref 8.4–10.2)
CHLORIDE SERPL-SCNC: 112 MMOL/L (ref 98–107)
CO2 SERPL-SCNC: 21 MMOL/L (ref 22–30)
CREAT SERPL-SCNC: 1.04 UMOL/L (ref 0.52–1.25)
EOSINOPHIL NFR BLD: 4 % (ref 0–4)
ERYTHROCYTE [DISTWIDTH] IN BLOOD BY AUTOMATED COUNT: 16 % (ref 11.5–14.5)
GLUCOSE SERPL-MCNC: 91 MG/DL (ref 74–106)
HCT VFR BLD AUTO: 22.3 % (ref 37–47)
HGB BLD-MCNC: 7.8 G/DL (ref 12.5–16)
HYPOCHROMIA BLD QL SMEAR: (no result)
LYMPHOCYTES NFR BLD MANUAL: 16 % (ref 20–51)
MCH RBC QN AUTO: 32 PG (ref 27–31)
MCHC RBC AUTO-ENTMCNC: 35 G/DL (ref 33–37)
MCV RBC AUTO: 91 FL (ref 80–100)
METAMYELOCYTES NFR BLD MANUAL: 1 % (ref 0–0)
MONOCYTES NFR BLD: 13 % (ref 1.7–9.3)
NEUTS BAND NFR BLD: 13 % (ref 0–10)
NEUTS SEG NFR BLD MANUAL: 53 % (ref 42–75.2)
PLATELET # BLD AUTO: 78 K/MM3 (ref 130–400)
PMV BLD AUTO: 12 FL (ref 7.4–10.4)
POTASSIUM SERPL-SCNC: 3.9 MMOL/L (ref 3.4–5)
PROT SERPL-MCNC: 6.7 GM/DL (ref 6.4–8.2)
RBC # BLD AUTO: 2.45 M/MM3 (ref 4.1–5.3)
SODIUM SERPL-SCNC: 139 MMOL/L (ref 137–145)
TARGETS BLD QL SMEAR: (no result)

## 2019-11-09 NOTE — NUR
Patient alert and oriented, answers questions appropriately.  See assessment.
Lungs decreased in bases, clear in upper lobes.  Chronic cough noted, states
now has yellow sputum, although unable to produce any for culture at this
time.  States feels well and expresses desire to discharge to home today.  No
other c/o at this time.

## 2019-11-09 NOTE — NUR
Plan to return home with  Jorge [phone] or cell [phone].
SW met with patient about dc, patient reports that she is cold and has
recieved several blankets. Patient rpoerts that her pcp is Dr. Mahajan of [facility], where she also obtained medications. Patient reports that her 
will transport her home. Patient denies needing in home supports.
SW will continue to follow for supports.

## 2019-11-09 NOTE — NUR
Initial shift assessment done- has temp 102, up walking around room, ST on
Tele, Tylenol given as ordered, states did just have a soft/loose stool,  Back
to bed-teady on feet, Iv fluids of NS at 75cc/hr

## 2019-11-09 NOTE — NUR
Did have temp of 102.1 during the night- Tylenol given,, coughing- cough
medicine given. Iv fluids of NS at 150cc/hr.

## 2019-11-10 VITALS — DIASTOLIC BLOOD PRESSURE: 69 MMHG | HEART RATE: 94 BPM | SYSTOLIC BLOOD PRESSURE: 120 MMHG | TEMPERATURE: 99.7 F

## 2019-11-10 VITALS — SYSTOLIC BLOOD PRESSURE: 117 MMHG | DIASTOLIC BLOOD PRESSURE: 59 MMHG | HEART RATE: 109 BPM | TEMPERATURE: 100.4 F

## 2019-11-10 VITALS — HEART RATE: 130 BPM | TEMPERATURE: 99.2 F | SYSTOLIC BLOOD PRESSURE: 155 MMHG | DIASTOLIC BLOOD PRESSURE: 86 MMHG

## 2019-11-10 VITALS — DIASTOLIC BLOOD PRESSURE: 78 MMHG | TEMPERATURE: 99.8 F | HEART RATE: 80 BPM | SYSTOLIC BLOOD PRESSURE: 140 MMHG

## 2019-11-10 VITALS — TEMPERATURE: 99.9 F | DIASTOLIC BLOOD PRESSURE: 65 MMHG | HEART RATE: 96 BPM | SYSTOLIC BLOOD PRESSURE: 107 MMHG

## 2019-11-10 VITALS — SYSTOLIC BLOOD PRESSURE: 112 MMHG | HEART RATE: 90 BPM | TEMPERATURE: 100 F | DIASTOLIC BLOOD PRESSURE: 57 MMHG

## 2019-11-10 VITALS — TEMPERATURE: 101.1 F | DIASTOLIC BLOOD PRESSURE: 87 MMHG | HEART RATE: 133 BPM | SYSTOLIC BLOOD PRESSURE: 153 MMHG

## 2019-11-10 VITALS — DIASTOLIC BLOOD PRESSURE: 73 MMHG | HEART RATE: 115 BPM | TEMPERATURE: 102.9 F | SYSTOLIC BLOOD PRESSURE: 139 MMHG

## 2019-11-10 LAB
ALBUMIN SERPL-MCNC: 3.1 GM/DL (ref 3.5–5)
ALP SERPL-CCNC: 40 U/L (ref 50–136)
ALT SERPL-CCNC: 26 U/L (ref 9–52)
ANION GAP SERPL CALC-SCNC: 5 MMOL/L (ref 7–16)
ANISOCYTOSIS BLD QL: (no result)
AST SERPL-CCNC: 31 U/L (ref 15–37)
BILIRUB SERPL-MCNC: 0.3 MG/DL (ref 0–1)
BUN SERPL-MCNC: 8 MG/DL (ref 7–17)
CALCIUM SERPL-MCNC: 8 MG/DL (ref 8.4–10.2)
CHLORIDE SERPL-SCNC: 110 MMOL/L (ref 98–107)
CO2 SERPL-SCNC: 23 MMOL/L (ref 22–30)
CREAT SERPL-SCNC: 0.98 UMOL/L (ref 0.52–1.25)
EOSINOPHIL NFR BLD: 4 % (ref 0–4)
ERYTHROCYTE [DISTWIDTH] IN BLOOD BY AUTOMATED COUNT: 15.9 % (ref 11.5–14.5)
GLUCOSE SERPL-MCNC: 81 MG/DL (ref 74–106)
HCT VFR BLD AUTO: 23.7 % (ref 37–47)
HGB BLD-MCNC: 8.4 G/DL (ref 12.5–16)
HYPOCHROMIA BLD QL SMEAR: (no result)
LYMPHOCYTES NFR BLD MANUAL: 12 % (ref 20–51)
MCH RBC QN AUTO: 32 PG (ref 27–31)
MCHC RBC AUTO-ENTMCNC: 35 G/DL (ref 33–37)
MCV RBC AUTO: 90 FL (ref 80–100)
MONOCYTES NFR BLD: 5 % (ref 1.7–9.3)
NEUTS BAND NFR BLD: 24 % (ref 0–10)
NEUTS SEG NFR BLD MANUAL: 55 % (ref 42–75.2)
PLATELET # BLD AUTO: 82 K/MM3 (ref 130–400)
PLATELET BLD QL SMEAR: (no result)
PMV BLD AUTO: 11.8 FL (ref 7.4–10.4)
POTASSIUM SERPL-SCNC: 4 MMOL/L (ref 3.4–5)
PROT SERPL-MCNC: 6.8 GM/DL (ref 6.4–8.2)
RBC # BLD AUTO: 2.63 M/MM3 (ref 4.1–5.3)
SODIUM SERPL-SCNC: 138 MMOL/L (ref 137–145)
TARGETS BLD QL SMEAR: (no result)

## 2019-11-10 NOTE — NUR
Patient resting in bed. A&O. Denies pain and discomfort. IV CDI, fluids
infusing. VSS. Oral temperature 99.7, patient stating that she "feels cold"
Will continue to monitor. No further needs expressed from patient. Call light
withn reach

## 2019-11-10 NOTE — NUR
Dr Menezes called to report patients increased HR and decreased blood sugar.
Orders received to get a EKG and continue with current IVF.

## 2019-11-10 NOTE — NUR
Telemetry called d/t pts HR being elevated in the 140s. Upon assessment, pt is
coughing. Vital signs are stable except HR of 130. Blood sugar checked- 68. Pt
is receiving D5NS via IV and is eating dinner. Pt reports haivng liquid BM
prior to the coughing.

## 2019-11-10 NOTE — NUR
Slept for a few hours- temp 102.2 early on this shift - now 100.0,, was given
tylenol twice tonight-  Had a total of 2 loose stools during the night, cough
medicine given once-

## 2019-11-10 NOTE — NUR
Patient resting in bed. A&O. Denies pain and discomfort. WBG WNL. VSS no
reported SOB. IV CDI, fluids infusing. Temperature decreasing after tylenol.
Will continue to monitor. Contact precautions in place. No further needs
expressed from patient. Call light within reach

## 2019-11-10 NOTE — NUR
Pt resting in bed. No acute distress noted. Pt denies pain at this time.
Respirations even and unlabored. Lungs clear to auscultation. Pt coughs
periodically but denies any sputum.Telemetry in place. HR is elevated but
Heart sounds are normal. Abdomen soft, nontender. BS+. Temperature is 99.8 at
this time. A&O. R H IV site with IVF infusing.

## 2019-11-10 NOTE — NUR
RT CALLED FOR PRN TX. PT ANXIOUS, TACHYPNEIC AND COUGHING. C/O SHORTNESS OF
BREATH. 2 LPM NC STARTED POST RT TX. PT  ARRIVED HELPING TO CALM PT. PT
STATED SHE FELT A LITTLE BETTER. RN TO GIVE ROBITUSSIN.

## 2019-11-11 VITALS — OXYGEN SATURATION: 95 %

## 2019-11-11 VITALS — OXYGEN SATURATION: 100 %

## 2019-11-11 VITALS — OXYGEN SATURATION: 99 %

## 2019-11-11 VITALS — OXYGEN SATURATION: 48 %

## 2019-11-11 VITALS — OXYGEN SATURATION: 90 %

## 2019-11-11 VITALS — OXYGEN SATURATION: 97 %

## 2019-11-11 VITALS — TEMPERATURE: 98.9 F | SYSTOLIC BLOOD PRESSURE: 187 MMHG | DIASTOLIC BLOOD PRESSURE: 127 MMHG | HEART RATE: 135 BPM

## 2019-11-11 VITALS — OXYGEN SATURATION: 96 %

## 2019-11-11 VITALS — OXYGEN SATURATION: 92 %

## 2019-11-11 VITALS — OXYGEN SATURATION: 100 % | DIASTOLIC BLOOD PRESSURE: 76 MMHG | HEART RATE: 94 BPM | SYSTOLIC BLOOD PRESSURE: 114 MMHG

## 2019-11-11 VITALS — OXYGEN SATURATION: 98 %

## 2019-11-11 VITALS — SYSTOLIC BLOOD PRESSURE: 158 MMHG | DIASTOLIC BLOOD PRESSURE: 83 MMHG | TEMPERATURE: 99.5 F | HEART RATE: 125 BPM

## 2019-11-11 VITALS — SYSTOLIC BLOOD PRESSURE: 140 MMHG | HEART RATE: 97 BPM | DIASTOLIC BLOOD PRESSURE: 94 MMHG

## 2019-11-11 VITALS — TEMPERATURE: 97.7 F | DIASTOLIC BLOOD PRESSURE: 70 MMHG | HEART RATE: 94 BPM | SYSTOLIC BLOOD PRESSURE: 101 MMHG

## 2019-11-11 VITALS — DIASTOLIC BLOOD PRESSURE: 94 MMHG | TEMPERATURE: 97.4 F | SYSTOLIC BLOOD PRESSURE: 147 MMHG | HEART RATE: 91 BPM

## 2019-11-11 VITALS — OXYGEN SATURATION: 72 %

## 2019-11-11 VITALS — OXYGEN SATURATION: 94 %

## 2019-11-11 VITALS — OXYGEN SATURATION: 79 %

## 2019-11-11 VITALS — DIASTOLIC BLOOD PRESSURE: 84 MMHG | SYSTOLIC BLOOD PRESSURE: 148 MMHG | TEMPERATURE: 101.4 F | HEART RATE: 132 BPM

## 2019-11-11 VITALS — TEMPERATURE: 103.8 F | HEART RATE: 122 BPM | SYSTOLIC BLOOD PRESSURE: 147 MMHG | DIASTOLIC BLOOD PRESSURE: 81 MMHG

## 2019-11-11 VITALS — OXYGEN SATURATION: 93 %

## 2019-11-11 VITALS — OXYGEN SATURATION: 62 %

## 2019-11-11 VITALS — OXYGEN SATURATION: 52 %

## 2019-11-11 VITALS — OXYGEN SATURATION: 74 %

## 2019-11-11 VITALS — OXYGEN SATURATION: 91 %

## 2019-11-11 VITALS — OXYGEN SATURATION: 88 %

## 2019-11-11 VITALS — OXYGEN SATURATION: 57 %

## 2019-11-11 VITALS — OXYGEN SATURATION: 84 %

## 2019-11-11 VITALS — OXYGEN SATURATION: 73 %

## 2019-11-11 VITALS — OXYGEN SATURATION: 78 %

## 2019-11-11 VITALS — OXYGEN SATURATION: 89 %

## 2019-11-11 VITALS — OXYGEN SATURATION: 83 %

## 2019-11-11 VITALS — OXYGEN SATURATION: 36 %

## 2019-11-11 VITALS — OXYGEN SATURATION: 87 %

## 2019-11-11 VITALS — OXYGEN SATURATION: 58 %

## 2019-11-11 VITALS — DIASTOLIC BLOOD PRESSURE: 73 MMHG | TEMPERATURE: 99.6 F | SYSTOLIC BLOOD PRESSURE: 141 MMHG | HEART RATE: 117 BPM

## 2019-11-11 VITALS — OXYGEN SATURATION: 77 %

## 2019-11-11 VITALS — OXYGEN SATURATION: 86 %

## 2019-11-11 VITALS — OXYGEN SATURATION: 80 %

## 2019-11-11 VITALS — OXYGEN SATURATION: 71 %

## 2019-11-11 VITALS
TEMPERATURE: 100 F | OXYGEN SATURATION: 100 % | SYSTOLIC BLOOD PRESSURE: 115 MMHG | HEART RATE: 99 BPM | DIASTOLIC BLOOD PRESSURE: 47 MMHG

## 2019-11-11 VITALS — OXYGEN SATURATION: 76 %

## 2019-11-11 VITALS — OXYGEN SATURATION: 75 %

## 2019-11-11 VITALS — OXYGEN SATURATION: 82 %

## 2019-11-11 VITALS — OXYGEN SATURATION: 70 %

## 2019-11-11 VITALS — OXYGEN SATURATION: 35 %

## 2019-11-11 VITALS — OXYGEN SATURATION: 81 %

## 2019-11-11 VITALS — OXYGEN SATURATION: 55 %

## 2019-11-11 LAB
ALBUMIN SERPL-MCNC: 3.7 GM/DL (ref 3.5–5)
ALP SERPL-CCNC: 43 U/L (ref 50–136)
ALT SERPL-CCNC: 33 U/L (ref 9–52)
ANION GAP SERPL CALC-SCNC: 6 MMOL/L (ref 7–16)
ANION GAP SERPL CALC-SCNC: 9 MMOL/L (ref 7–16)
AST SERPL-CCNC: 56 U/L (ref 15–37)
BASE EXCESS BLDA CALC-SCNC: -3.9 MMOL/L (ref -2–2)
BASE EXCESS BLDA CALC-SCNC: -6.1 MMOL/L (ref -2–2)
BILIRUB SERPL-MCNC: 0.5 MG/DL (ref 0–1)
BUN SERPL-MCNC: 11 MG/DL (ref 7–17)
BUN SERPL-MCNC: 9 MG/DL (ref 7–17)
CALCIUM SERPL-MCNC: 7 MG/DL (ref 8.4–10.2)
CALCIUM SERPL-MCNC: 8.2 MG/DL (ref 8.4–10.2)
CHLORIDE SERPL-SCNC: 108 MMOL/L (ref 98–107)
CHLORIDE SERPL-SCNC: 109 MMOL/L (ref 98–107)
CO2 BLDA-SCNC: 19.9 MMOL/L
CO2 BLDA-SCNC: 20.3 MMOL/L
CO2 SERPL-SCNC: 21 MMOL/L (ref 22–30)
CO2 SERPL-SCNC: 22 MMOL/L (ref 22–30)
CREAT SERPL-SCNC: 0.8 UMOL/L (ref 0.52–1.25)
CREAT SERPL-SCNC: 0.89 UMOL/L (ref 0.52–1.25)
EOSINOPHIL NFR BLD: 1 % (ref 0–4)
ERYTHROCYTE [DISTWIDTH] IN BLOOD BY AUTOMATED COUNT: 15.9 % (ref 11.5–14.5)
GLUCOSE SERPL-MCNC: 138 MG/DL (ref 74–106)
GLUCOSE SERPL-MCNC: 188 MG/DL (ref 74–106)
HCO3 BLDA-SCNC: 19 MEQ/L (ref 22–26)
HCO3 BLDA-SCNC: 19.1 MEQ/L (ref 22–26)
HCT VFR BLD AUTO: 23.2 % (ref 37–47)
HGB BLD-MCNC: 8.4 G/DL (ref 12.5–16)
HYPOCHROMIA BLD QL SMEAR: (no result)
INHALED O2 CONCENTRATION: 100 %
INR BLD: 1.7 (ref 0.8–3)
LYMPHOCYTES NFR BLD MANUAL: 12 % (ref 20–51)
MCH RBC QN AUTO: 32 PG (ref 27–31)
MCHC RBC AUTO-ENTMCNC: 36 G/DL (ref 33–37)
MCV RBC AUTO: 90 FL (ref 80–100)
MONOCYTES NFR BLD: 9 % (ref 1.7–9.3)
NEUTS BAND NFR BLD: 22 % (ref 0–10)
NEUTS SEG NFR BLD MANUAL: 56 % (ref 42–75.2)
PCO2 BLDA: 26.7 MMHG (ref 35–45)
PCO2 BLDA: 36.9 MMHG (ref 35–45)
PLATELET # BLD AUTO: 87 K/MM3 (ref 130–400)
PMV BLD AUTO: 12.6 FL (ref 7.4–10.4)
PO2 BLDA: 245.4 MMHG (ref 80–100)
PO2 BLDA: 58.2 MMHG (ref 80–100)
POTASSIUM SERPL-SCNC: 3.9 MMOL/L (ref 3.4–5)
POTASSIUM SERPL-SCNC: 4.5 MMOL/L (ref 3.4–5)
PROT SERPL-MCNC: 7.8 GM/DL (ref 6.4–8.2)
PROTHROMBIN TIME: 20.3 SECONDS (ref 9.7–12.8)
RBC # BLD AUTO: 2.59 M/MM3 (ref 4.1–5.3)
SALICYLATES SERPL-MCNC: 1 MG/DL
SAO2 % BLDA: 90.9 % (ref 92–100)
SAO2 % BLDA: 98.9 % (ref 92–100)
SODIUM SERPL-SCNC: 138 MMOL/L (ref 137–145)
SODIUM SERPL-SCNC: 138 MMOL/L (ref 137–145)
TARGETS BLD QL SMEAR: (no result)
TROPONIN I SERPL-MCNC: 0.14 NG/ML (ref 0–0.04)

## 2019-11-11 PROCEDURE — 5A1955Z RESPIRATORY VENTILATION, GREATER THAN 96 CONSECUTIVE HOURS: ICD-10-PCS | Performed by: STUDENT IN AN ORGANIZED HEALTH CARE EDUCATION/TRAINING PROGRAM

## 2019-11-11 PROCEDURE — 02HV33Z INSERTION OF INFUSION DEVICE INTO SUPERIOR VENA CAVA, PERCUTANEOUS APPROACH: ICD-10-PCS | Performed by: STUDENT IN AN ORGANIZED HEALTH CARE EDUCATION/TRAINING PROGRAM

## 2019-11-11 PROCEDURE — 0BH18EZ INSERTION OF ENDOTRACHEAL AIRWAY INTO TRACHEA, VIA NATURAL OR ARTIFICIAL OPENING ENDOSCOPIC: ICD-10-PCS | Performed by: STUDENT IN AN ORGANIZED HEALTH CARE EDUCATION/TRAINING PROGRAM

## 2019-11-11 NOTE — NUR
Pt up to bathroom. BM x1-loose. Pt is dyspneic with exertion. Upon returning
to bed, scattered expiratory wheezes auscultated throughout lung fields. VSS
with exception of elevated HR-125.

## 2019-11-11 NOTE — NUR
AT APPROX 1140, PATIENT ARRIVES TO ICU. SHE IS TACHYPNEIC AND RESTLESS. DR. ORR CALLED AND  CALLED TO COME TO HOSPITAL AS SOON AS POSSIBLE.

## 2019-11-11 NOTE — NUR
0900 entered patients room to perform assessment and administer medications.
Patient is observed sitting on the side of the bed and is visibly tachypneic.
Respirations are shallow and patient is making a high pitched grunting noise.
She states she is very dizzy and could possibly pass out.  Breakfast tray
removed and patient was assisted back in bed with head elevated.  VS obtained
at this time.  /81, O2 96%, , R 28 and temp is 103.8.  Patient is
shaking and continues to breathe shallowly.  Complains of having  a headache.
PRN tylenol administered and patient wished to take other medications as well.
Blood glucose was checked and was 143.  Notified providers of increased
respirations and temperature, no new orders received.  Did go out of room with
breakfast tray as patient stated she was through.  She ate a few bites of
pancake and drank 100 ml of orange juice.  Did reassess vital signs at 0900,
/87, O2 93%, temp 101.7, respirations 44, .  Patient states her
head is still hurting and she is actually starting to feel sick as she didn't
"feel that bad" when she arrived.  Did ascultate lung sounds, bilateral lower
lobes are coarse, upper have expiratory wheezing and are diminished.  She is
heard to be grunting.  Spoke withh provider again, they will be in to see her
next.  Dr. Menezes assessing patient, spoke with her regarding code status and
respiratory interventions.  Sepsis protocol initiated and ordered
medications/fluids were admnistred as ordered.  Labs drawn, ABGs obtained,
pulmonolgy notified.  Was assisted to CT at 1135 and is to transfer to ICU
after testing, pt was aware of this and had been notifying  by text
messaging.  I did go with patient to CT, she became very diaphoretic after
transfer to the CT table.  She pulled off isolation gown and requested to take
off her oxygen.  Was encouraged to leave on but she did for her own comfort.
Was assited to they laying position and then became anxious, reached for staff
hands and had to be assisted to sitting again.  She was explained how long the
test would take and said she could handle the amount of time testing was
necessary.  Upon completion, she transferred back to the bed with assitance,
energy level noted to be very poor.  Required assistance to move to the middle
of the bed and move up.  Did sit up with head of bed highly elevated in a
tripod position on the way to the ICU.  Was taken to room #4.  Stated she
needed to urinate, wished to get up to the toilet but was encouraged to use a
bed pan and did agree as she was becoming quite fatigued.  Did have moderate
amount of urine.  Was provided with pericare and then assisted to ICU bed.
Report given to Natali.

## 2019-11-11 NOTE — NUR
Pt resting this AM. Pt did not sleep very much last night. She was up several
times to bathroom and coughing frequently. Blood sugar is stable this AM.
Edema to R hand has decreased since last night.

## 2019-11-11 NOTE — NUR
NOTIFIED RHONDA RN WITH [facility] REGARDING PT HAVING A BG OF 30 D50 GIVEN ONLY UP
TO 34 SECOND AMP OF D50 GIVEN. RECHECK SHOWING 104. NOTIFIED DR TERESA. ORDERS
RECEIVED TO DO BMP AND FINGER STICK AT THE SAME TIME.

## 2019-11-11 NOTE — NUR
PT'S FINGERS AND HANDS EXTREMELY EDEMATOUS. I VOICED CONCERNS TO PT'S 
REGARDING THE PATIENT'S RINGS.   AGREES THAT PT'S RINGS SHOULD BE
REMOVED.  PATIENT'S RINGS REMOVED WITH  AT BEDSIDE AND GIVEN TO 
SAFE KEEPING.  ALSO HAS PATIENT'S PHONE AND SAYS HE WILL TAKE PT'S
PHONE HOME FOR SAFE KEEPING AS WELL.

## 2019-11-11 NOTE — NUR
PT INTUBATED AT 1229; ETT PLACED AT 23 AT THE TEETH.  MOVED TO 22 AT THE TEETH
POST EXTUBATION PER CXR PER CRNA. BILATERAL BREATHSOUNDS, POSITIVE CAPNOGRAPHY
NOTED.

## 2019-11-11 NOTE — NUR
NOTIFIED RHONDA RN WITH [facility] REGARDING PTS TROPONIN LEVEL 0.141. STATES HE WILL
NOTIFY ON-CALL PHYSICIAN.

## 2019-11-11 NOTE — NUR
NOTIFIED DR ORR OF PT'S BP DECLINING STEADILY OVER PAST 1.5-2 HOURS. MAP
IS ABOVE 65 BUT SBP <90, DBP IS AROUND 60/70.

## 2019-11-12 VITALS — OXYGEN SATURATION: 100 %

## 2019-11-12 VITALS — OXYGEN SATURATION: 98 %

## 2019-11-12 VITALS — OXYGEN SATURATION: 92 %

## 2019-11-12 VITALS
HEART RATE: 86 BPM | TEMPERATURE: 100.7 F | DIASTOLIC BLOOD PRESSURE: 66 MMHG | SYSTOLIC BLOOD PRESSURE: 102 MMHG | OXYGEN SATURATION: 100 %

## 2019-11-12 VITALS — HEART RATE: 95 BPM | DIASTOLIC BLOOD PRESSURE: 62 MMHG | SYSTOLIC BLOOD PRESSURE: 88 MMHG | TEMPERATURE: 99.1 F

## 2019-11-12 VITALS — DIASTOLIC BLOOD PRESSURE: 55 MMHG | SYSTOLIC BLOOD PRESSURE: 80 MMHG | HEART RATE: 91 BPM

## 2019-11-12 VITALS — OXYGEN SATURATION: 99 % | DIASTOLIC BLOOD PRESSURE: 90 MMHG | SYSTOLIC BLOOD PRESSURE: 127 MMHG | HEART RATE: 112 BPM

## 2019-11-12 VITALS
DIASTOLIC BLOOD PRESSURE: 63 MMHG | OXYGEN SATURATION: 100 % | HEART RATE: 81 BPM | TEMPERATURE: 100 F | SYSTOLIC BLOOD PRESSURE: 93 MMHG

## 2019-11-12 VITALS — OXYGEN SATURATION: 94 %

## 2019-11-12 VITALS
DIASTOLIC BLOOD PRESSURE: 62 MMHG | TEMPERATURE: 100 F | SYSTOLIC BLOOD PRESSURE: 91 MMHG | OXYGEN SATURATION: 100 % | HEART RATE: 86 BPM

## 2019-11-12 VITALS — SYSTOLIC BLOOD PRESSURE: 125 MMHG | OXYGEN SATURATION: 100 % | DIASTOLIC BLOOD PRESSURE: 80 MMHG

## 2019-11-12 VITALS — SYSTOLIC BLOOD PRESSURE: 114 MMHG | HEART RATE: 86 BPM | OXYGEN SATURATION: 100 % | DIASTOLIC BLOOD PRESSURE: 75 MMHG

## 2019-11-12 VITALS
TEMPERATURE: 103.2 F | OXYGEN SATURATION: 100 % | HEART RATE: 92 BPM | SYSTOLIC BLOOD PRESSURE: 92 MMHG | DIASTOLIC BLOOD PRESSURE: 63 MMHG

## 2019-11-12 VITALS — SYSTOLIC BLOOD PRESSURE: 137 MMHG | DIASTOLIC BLOOD PRESSURE: 86 MMHG | HEART RATE: 130 BPM | OXYGEN SATURATION: 100 %

## 2019-11-12 VITALS
DIASTOLIC BLOOD PRESSURE: 64 MMHG | TEMPERATURE: 102.5 F | OXYGEN SATURATION: 100 % | SYSTOLIC BLOOD PRESSURE: 91 MMHG | HEART RATE: 101 BPM

## 2019-11-12 VITALS — OXYGEN SATURATION: 99 %

## 2019-11-12 VITALS — OXYGEN SATURATION: 95 %

## 2019-11-12 VITALS — OXYGEN SATURATION: 97 %

## 2019-11-12 VITALS
DIASTOLIC BLOOD PRESSURE: 73 MMHG | TEMPERATURE: 100.6 F | OXYGEN SATURATION: 100 % | SYSTOLIC BLOOD PRESSURE: 115 MMHG | HEART RATE: 103 BPM

## 2019-11-12 VITALS
TEMPERATURE: 100.3 F | HEART RATE: 86 BPM | OXYGEN SATURATION: 98 % | SYSTOLIC BLOOD PRESSURE: 104 MMHG | DIASTOLIC BLOOD PRESSURE: 68 MMHG

## 2019-11-12 VITALS
HEART RATE: 83 BPM | OXYGEN SATURATION: 100 % | DIASTOLIC BLOOD PRESSURE: 62 MMHG | SYSTOLIC BLOOD PRESSURE: 91 MMHG | TEMPERATURE: 100 F

## 2019-11-12 VITALS — TEMPERATURE: 100 F | HEART RATE: 81 BPM | DIASTOLIC BLOOD PRESSURE: 70 MMHG | SYSTOLIC BLOOD PRESSURE: 111 MMHG

## 2019-11-12 VITALS — OXYGEN SATURATION: 93 %

## 2019-11-12 VITALS — OXYGEN SATURATION: 100 % | SYSTOLIC BLOOD PRESSURE: 106 MMHG | DIASTOLIC BLOOD PRESSURE: 67 MMHG

## 2019-11-12 VITALS — OXYGEN SATURATION: 85 %

## 2019-11-12 VITALS
OXYGEN SATURATION: 100 % | DIASTOLIC BLOOD PRESSURE: 67 MMHG | TEMPERATURE: 99.9 F | SYSTOLIC BLOOD PRESSURE: 100 MMHG | HEART RATE: 78 BPM

## 2019-11-12 VITALS — SYSTOLIC BLOOD PRESSURE: 104 MMHG | HEART RATE: 84 BPM | OXYGEN SATURATION: 100 % | DIASTOLIC BLOOD PRESSURE: 68 MMHG

## 2019-11-12 VITALS
DIASTOLIC BLOOD PRESSURE: 68 MMHG | SYSTOLIC BLOOD PRESSURE: 103 MMHG | TEMPERATURE: 101.6 F | OXYGEN SATURATION: 100 % | HEART RATE: 87 BPM

## 2019-11-12 VITALS — TEMPERATURE: 101.6 F | OXYGEN SATURATION: 100 %

## 2019-11-12 VITALS — OXYGEN SATURATION: 100 % | HEART RATE: 82 BPM | DIASTOLIC BLOOD PRESSURE: 79 MMHG | SYSTOLIC BLOOD PRESSURE: 117 MMHG

## 2019-11-12 VITALS — DIASTOLIC BLOOD PRESSURE: 74 MMHG | SYSTOLIC BLOOD PRESSURE: 116 MMHG | OXYGEN SATURATION: 100 %

## 2019-11-12 VITALS — DIASTOLIC BLOOD PRESSURE: 89 MMHG | OXYGEN SATURATION: 100 % | SYSTOLIC BLOOD PRESSURE: 137 MMHG

## 2019-11-12 VITALS — OXYGEN SATURATION: 100 % | DIASTOLIC BLOOD PRESSURE: 56 MMHG | HEART RATE: 80 BPM | SYSTOLIC BLOOD PRESSURE: 85 MMHG

## 2019-11-12 VITALS — OXYGEN SATURATION: 84 %

## 2019-11-12 VITALS
OXYGEN SATURATION: 100 % | HEART RATE: 81 BPM | SYSTOLIC BLOOD PRESSURE: 90 MMHG | TEMPERATURE: 100.1 F | DIASTOLIC BLOOD PRESSURE: 62 MMHG

## 2019-11-12 VITALS
TEMPERATURE: 100.8 F | DIASTOLIC BLOOD PRESSURE: 73 MMHG | SYSTOLIC BLOOD PRESSURE: 108 MMHG | OXYGEN SATURATION: 100 % | HEART RATE: 85 BPM

## 2019-11-12 VITALS
DIASTOLIC BLOOD PRESSURE: 63 MMHG | HEART RATE: 86 BPM | OXYGEN SATURATION: 100 % | TEMPERATURE: 99.9 F | SYSTOLIC BLOOD PRESSURE: 95 MMHG

## 2019-11-12 VITALS — OXYGEN SATURATION: 96 %

## 2019-11-12 VITALS — SYSTOLIC BLOOD PRESSURE: 82 MMHG | DIASTOLIC BLOOD PRESSURE: 54 MMHG | OXYGEN SATURATION: 100 %

## 2019-11-12 VITALS
OXYGEN SATURATION: 100 % | DIASTOLIC BLOOD PRESSURE: 71 MMHG | TEMPERATURE: 100 F | HEART RATE: 89 BPM | SYSTOLIC BLOOD PRESSURE: 104 MMHG

## 2019-11-12 VITALS — OXYGEN SATURATION: 86 %

## 2019-11-12 VITALS — SYSTOLIC BLOOD PRESSURE: 80 MMHG | OXYGEN SATURATION: 100 % | DIASTOLIC BLOOD PRESSURE: 50 MMHG

## 2019-11-12 VITALS — HEART RATE: 85 BPM | DIASTOLIC BLOOD PRESSURE: 59 MMHG | SYSTOLIC BLOOD PRESSURE: 87 MMHG | OXYGEN SATURATION: 100 %

## 2019-11-12 VITALS — SYSTOLIC BLOOD PRESSURE: 83 MMHG | OXYGEN SATURATION: 100 % | DIASTOLIC BLOOD PRESSURE: 56 MMHG

## 2019-11-12 LAB
ALBUMIN SERPL-MCNC: 2.5 GM/DL (ref 3.5–5)
ALP SERPL-CCNC: 33 U/L (ref 50–136)
ALT SERPL-CCNC: 54 U/L (ref 9–52)
ANION GAP SERPL CALC-SCNC: 5 MMOL/L (ref 7–16)
ANISOCYTOSIS BLD QL: (no result)
AST SERPL-CCNC: 53 U/L (ref 15–37)
BASE EXCESS BLDA CALC-SCNC: -2.7 MMOL/L (ref -2–2)
BASOPHILS NFR BLD MANUAL: 1 % (ref 0–2)
BILIRUB SERPL-MCNC: 0.5 MG/DL (ref 0–1)
BUN SERPL-MCNC: 10 MG/DL (ref 7–17)
CALCIUM SERPL-MCNC: 6.9 MG/DL (ref 8.4–10.2)
CHLORIDE SERPL-SCNC: 110 MMOL/L (ref 98–107)
CO2 BLDA-SCNC: 23 MMOL/L
CO2 SERPL-SCNC: 23 MMOL/L (ref 22–30)
CREAT SERPL-SCNC: 0.86 UMOL/L (ref 0.52–1.25)
DACRYOCYTES BLD QL SMEAR: (no result)
EOSINOPHIL NFR BLD: 1 % (ref 0–4)
ERYTHROCYTE [DISTWIDTH] IN BLOOD BY AUTOMATED COUNT: 16 % (ref 11.5–14.5)
GLUCOSE SERPL-MCNC: 111 MG/DL (ref 74–106)
HCO3 BLDA-SCNC: 21.9 MEQ/L (ref 22–26)
HCT VFR BLD AUTO: 19.4 % (ref 37–47)
HCT VFR BLD AUTO: 22.4 % (ref 37–47)
HGB BLD-MCNC: 7.1 G/DL (ref 12.5–16)
HGB BLD-MCNC: 8 G/DL (ref 12.5–16)
INHALED O2 CONCENTRATION: 50 %
INR BLD: 1.9 (ref 0.8–3)
LYMPHOCYTES NFR BLD MANUAL: 18 % (ref 20–51)
MAGNESIUM SERPL-MCNC: 1.3 MG/DL (ref 1.6–2.3)
MAGNESIUM SERPL-MCNC: 1.9 MG/DL (ref 1.6–2.3)
MCH RBC QN AUTO: 32 PG (ref 27–31)
MCHC RBC AUTO-ENTMCNC: 37 G/DL (ref 33–37)
MCV RBC AUTO: 89 FL (ref 80–100)
MONOCYTES NFR BLD: 8 % (ref 1.7–9.3)
NEUTS BAND NFR BLD: 27 % (ref 0–10)
NEUTS SEG NFR BLD MANUAL: 45 % (ref 42–75.2)
PCO2 BLDA: 36.6 MMHG (ref 35–45)
PHOSPHATE SERPL-MCNC: 1.8 MG/DL (ref 2.5–4.5)
PLATELET # BLD AUTO: 97 K/MM3 (ref 130–400)
PLATELET BLD QL SMEAR: (no result)
PMV BLD AUTO: 11.7 FL (ref 7.4–10.4)
PO2 BLDA: 141.4 MMHG (ref 80–100)
POTASSIUM SERPL-SCNC: 3.9 MMOL/L (ref 3.4–5)
PROT SERPL-MCNC: 5.7 GM/DL (ref 6.4–8.2)
PROTHROMBIN TIME: 22.7 SECONDS (ref 9.7–12.8)
RBC # BLD AUTO: 2.19 M/MM3 (ref 4.1–5.3)
SAO2 % BLDA: 98.1 % (ref 92–100)
SODIUM SERPL-SCNC: 138 MMOL/L (ref 137–145)
TARGETS BLD QL SMEAR: (no result)
TROPONIN I SERPL-MCNC: 0.07 NG/ML (ref 0–0.04)

## 2019-11-12 NOTE — NUR
primary care nurse reported having difficulty obtaining lab samples via PICC
line.  Reviewed this a.m. chest x-ray.  With sterile technique right upper arm
dressing change done with insertion site cleansed with ChloraPrep 1, catheter
pulled back 2 cm to 1 cm marking on catheter, skin prep, StatLock,
chlorhexidine impregnated disc applied, and Tegaderm applied.  Arm wrapped
with Ace to protect catheter.  No signs or symptoms of IV complications noted.
 RN  reported later in the day no difficulty with obtaining lab samples
now.

## 2019-11-12 NOTE — NUR
PT IS ALERT AND FOLLOWING COMMANDS. PT ANSWERING YES/NO QUESTIONS
APPROPRIATLY. PT UPDATED ON PLAN. PT TOLERATING ABG'S AND ORAL CARE WELL. PT
DID BEGIN TO COUGH AGAINST VENT. SEDATION BACK TO ORIGINAL SETTINGS.

## 2019-11-12 NOTE — NUR
0045 PT SPIKED A FEVER .5. TYLENOL GIVEN AND ICE PACKS PLACED
0130 PT BP DROPPED TO THE 80'S WITH MAP IN THE 50'S. LEVOPHED STARTED. TEMP
COMING DOWN 101.6.
0142 RHONDA RN WITH [facility] NOTIFIED OF THE ABOVE. STATES HE WILL RELAY TO DR. TERESA. PT'S BP /68. WILL CONTINUE TO MONITOR

## 2019-11-12 NOTE — NUR
PER PROTOCOL PT HAS TO BE INTUBATED FOR MORE THAN 24 HOURS BEFORE A WEANING
TRIAL CAN BE STARTED. THEREFORE WEANING TRIAL ON THIS PT WILL NOT BE STARTED
UNTIL 11/13 IN THE MORNING.PT IS ON DOCUMENTED SETTINGS MARION WELL WITH NO
DISTRESS NOTED WILL NOTIFY RN AND DAY SHIFT RT OF NO WEANING TRIAL BEING
INITIATED. WILL CONTIINUE TO MONITOR AND ASSESS

## 2019-11-12 NOTE — NUR
NOTIFIED RHONDA VARGAS WITH [facility] OF PT'S VBG'S. INSTRUCTED TO GET PTS AM LABS AT
AROUND 0300. LAB CALLED AN NOTIFIED.

## 2019-11-12 NOTE — NUR
CALL PLACED TO PT'S  SEVERIANO DAVIES [phone] TO OBTAIN BLOOD CONSENT.
THERE WAS NO ANSWER. MESSAGE LEFT FOR HIM TO CALL BACK.

## 2019-11-12 NOTE — NUR
DECREASE PROPOFOL GTT FOR SEDATION VACATION. PT OPENS EYES TO NAME. FOLLOW
SIMPLE COMMANDS. MOVES ALL EXTREMITIES.

## 2019-11-12 NOTE — NUR
NOTIFIED RHONDA VARGAS WITH [facility] REGARDING AM LABS. STATES HE WILL NOTIFY .
WILL CONTINUE TO MONITOR.

## 2019-11-12 NOTE — NUR
Assessment completed. Pt on ventilator with propofol and fentanyl gtts for
sedation. Opens eyes to name. Follows simple commands. Moves all extremities.
Vital signs stable. Titrating levophed gtt down. Discussed plan of care r/t
oral care, medications and ventilator. Pt nodded for understanding. Will
monitor.

## 2019-11-12 NOTE — NUR
[facility] WAS CALLED ABOUT HIGH PaO2 AND VERIFIED THAT FIO2 CAN BE TURNED DOWN TO
40%. NOTIFIED NURSE AND DAY SHIFT RT. PT MARION WELL WITH NO DISTRESS NOTED AT
THIS TIME. PT ON THE CURRENT DOCUMENTED SETTINGS WITH THE FI02 CHANGE.

## 2019-11-12 NOTE — NUR
0512: PTS BG FROM FINGER STICK 66. HALF AMP OF D50 GIVEN.
0527: PTS BG FROM FINGER STICK 52. PT AWAKE AND ANSWERING QUESTIONS.
0530: PTS BG FROM PICC LINE 182. CALL PLACED TO RHONDA VARGAS WITH [facility] TO UPDATE
ON ABOVE. HE STATES YOUR SERUM BG FROM PICC SHOULD BE MOST ACCURATE. WILL
CONTIUE TO MONITOR.

## 2019-11-13 VITALS — OXYGEN SATURATION: 100 %

## 2019-11-13 VITALS — OXYGEN SATURATION: 99 %

## 2019-11-13 VITALS — SYSTOLIC BLOOD PRESSURE: 130 MMHG | DIASTOLIC BLOOD PRESSURE: 82 MMHG | HEART RATE: 84 BPM

## 2019-11-13 VITALS — SYSTOLIC BLOOD PRESSURE: 83 MMHG | DIASTOLIC BLOOD PRESSURE: 54 MMHG | OXYGEN SATURATION: 100 %

## 2019-11-13 VITALS
TEMPERATURE: 100.1 F | DIASTOLIC BLOOD PRESSURE: 91 MMHG | HEART RATE: 69 BPM | OXYGEN SATURATION: 100 % | SYSTOLIC BLOOD PRESSURE: 88 MMHG

## 2019-11-13 VITALS — OXYGEN SATURATION: 92 %

## 2019-11-13 VITALS — OXYGEN SATURATION: 93 %

## 2019-11-13 VITALS
OXYGEN SATURATION: 100 % | HEART RATE: 93 BPM | TEMPERATURE: 98.9 F | SYSTOLIC BLOOD PRESSURE: 128 MMHG | DIASTOLIC BLOOD PRESSURE: 81 MMHG

## 2019-11-13 VITALS — OXYGEN SATURATION: 95 %

## 2019-11-13 VITALS — OXYGEN SATURATION: 98 %

## 2019-11-13 VITALS
HEART RATE: 83 BPM | TEMPERATURE: 99.3 F | DIASTOLIC BLOOD PRESSURE: 68 MMHG | OXYGEN SATURATION: 100 % | SYSTOLIC BLOOD PRESSURE: 111 MMHG

## 2019-11-13 VITALS — OXYGEN SATURATION: 97 %

## 2019-11-13 VITALS
TEMPERATURE: 100.3 F | OXYGEN SATURATION: 100 % | SYSTOLIC BLOOD PRESSURE: 93 MMHG | DIASTOLIC BLOOD PRESSURE: 61 MMHG | HEART RATE: 88 BPM

## 2019-11-13 VITALS — DIASTOLIC BLOOD PRESSURE: 56 MMHG | SYSTOLIC BLOOD PRESSURE: 87 MMHG | OXYGEN SATURATION: 100 %

## 2019-11-13 VITALS — OXYGEN SATURATION: 100 % | HEART RATE: 84 BPM | DIASTOLIC BLOOD PRESSURE: 82 MMHG | SYSTOLIC BLOOD PRESSURE: 133 MMHG

## 2019-11-13 VITALS — OXYGEN SATURATION: 94 %

## 2019-11-13 VITALS
HEART RATE: 91 BPM | SYSTOLIC BLOOD PRESSURE: 119 MMHG | DIASTOLIC BLOOD PRESSURE: 85 MMHG | OXYGEN SATURATION: 100 % | TEMPERATURE: 99.6 F

## 2019-11-13 VITALS — OXYGEN SATURATION: 96 %

## 2019-11-13 VITALS — SYSTOLIC BLOOD PRESSURE: 106 MMHG | DIASTOLIC BLOOD PRESSURE: 69 MMHG | HEART RATE: 93 BPM | TEMPERATURE: 100.3 F

## 2019-11-13 VITALS — SYSTOLIC BLOOD PRESSURE: 87 MMHG | OXYGEN SATURATION: 100 % | DIASTOLIC BLOOD PRESSURE: 57 MMHG

## 2019-11-13 VITALS — OXYGEN SATURATION: 100 % | SYSTOLIC BLOOD PRESSURE: 118 MMHG | DIASTOLIC BLOOD PRESSURE: 75 MMHG

## 2019-11-13 VITALS — OXYGEN SATURATION: 100 % | TEMPERATURE: 101.6 F

## 2019-11-13 LAB
ALBUMIN SERPL-MCNC: 2.4 GM/DL (ref 3.5–5)
ALP SERPL-CCNC: 43 U/L (ref 50–136)
ALT SERPL-CCNC: 43 U/L (ref 9–52)
ANION GAP SERPL CALC-SCNC: 5 MMOL/L (ref 7–16)
AST SERPL-CCNC: 37 U/L (ref 15–37)
BASE EXCESS BLDA CALC-SCNC: -4 MMOL/L (ref -2–2)
BASOPHILS # BLD: 0 10*3/UL (ref 0–0.2)
BASOPHILS NFR BLD AUTO: 0.3 % (ref 0–2)
BILIRUB SERPL-MCNC: 0.5 MG/DL (ref 0–1)
BUN SERPL-MCNC: 10 MG/DL (ref 7–17)
CALCIUM SERPL-MCNC: 6.5 MG/DL (ref 8.4–10.2)
CHLORIDE SERPL-SCNC: 113 MMOL/L (ref 98–107)
CO2 BLDA-SCNC: 20.9 MMOL/L
CO2 SERPL-SCNC: 21 MMOL/L (ref 22–30)
CREAT SERPL-SCNC: 0.85 UMOL/L (ref 0.52–1.25)
EOSINOPHIL # BLD: 0.2 10*3/UL (ref 0–0.7)
EOSINOPHIL NFR BLD: 3.2 % (ref 0–4)
ERYTHROCYTE [DISTWIDTH] IN BLOOD BY AUTOMATED COUNT: 15.6 % (ref 11.5–14.5)
GLUCOSE SERPL-MCNC: 127 MG/DL (ref 74–106)
GRANULOCYTES # BLD AUTO: 55.4 % (ref 42.2–75.2)
HCO3 BLDA-SCNC: 20 MEQ/L (ref 22–26)
HCT VFR BLD AUTO: 21.2 % (ref 37–47)
HGB BLD-MCNC: 7.6 G/DL (ref 12.5–16)
INHALED O2 CONCENTRATION: 40 %
INR BLD: 1.7 (ref 0.8–3)
LYMPHOCYTES # BLD: 1.8 10*3/UL (ref 1.2–3.4)
LYMPHOCYTES NFR BLD: 24.3 % (ref 20–51)
MAGNESIUM SERPL-MCNC: 1.8 MG/DL (ref 1.6–2.3)
MCH RBC QN AUTO: 32 PG (ref 27–31)
MCHC RBC AUTO-ENTMCNC: 36 G/DL (ref 33–37)
MCV RBC AUTO: 89 FL (ref 80–100)
MONOCYTES # BLD: 1.2 10*3/UL (ref 0.1–0.6)
MONOCYTES NFR BLD AUTO: 16.4 % (ref 1.7–9.3)
NEUTROPHILS # BLD: 4.2 10*3/UL (ref 1.4–6.5)
PCO2 BLDA: 32.2 MMHG (ref 35–45)
PHOSPHATE SERPL-MCNC: 1.7 MG/DL (ref 2.5–4.5)
PLATELET # BLD AUTO: 129 K/MM3 (ref 130–400)
PMV BLD AUTO: 12.2 FL (ref 7.4–10.4)
PO2 BLDA: 142.2 MMHG (ref 80–100)
POTASSIUM SERPL-SCNC: 3.7 MMOL/L (ref 3.4–5)
PROT SERPL-MCNC: 5.6 GM/DL (ref 6.4–8.2)
PROTHROMBIN TIME: 19.8 SECONDS (ref 9.7–12.8)
RBC # BLD AUTO: 2.38 M/MM3 (ref 4.1–5.3)
SAO2 % BLDA: 98.3 % (ref 92–100)
SODIUM SERPL-SCNC: 140 MMOL/L (ref 137–145)

## 2019-11-13 PROCEDURE — 0B9D8ZX DRAINAGE OF RIGHT MIDDLE LUNG LOBE, VIA NATURAL OR ARTIFICIAL OPENING ENDOSCOPIC, DIAGNOSTIC: ICD-10-PCS | Performed by: INTERNAL MEDICINE

## 2019-11-13 PROCEDURE — 0B9F8ZX DRAINAGE OF RIGHT LOWER LUNG LOBE, VIA NATURAL OR ARTIFICIAL OPENING ENDOSCOPIC, DIAGNOSTIC: ICD-10-PCS | Performed by: INTERNAL MEDICINE

## 2019-11-13 NOTE — NUR
PT WAKES TO VOICE. PT ABLE TO FOLLOW COMMANDS AND ANSWER YES/NO QUESTIONS
APPROPRIATELY. PT DOES BEGIN TO COUGH SO SEDATION INCREASED BACK TO PRIOR.

## 2019-11-13 NOTE — NUR
DUE TO PROTOCOL AND CRITERIA FOR A WEANING TRIAL PT HAS A FEVER THEREFORE IS
UNABLE TO DO A WEANING TRIAL. PT IS ON DOCUMENTED SETTINGS AND IS MARION WELL
WITH NO DISTRESS NOTED AT THIS TIME. WILL NOTIFY DAY SHIFT RT AND LET RN KNOW.
WILL CONTINUE TO MONITOR AND ASSESS

## 2019-11-13 NOTE — NUR
Initial visit with family member letting her know of the availability of
spiritual care for her mother and all family members. She thanked .

## 2019-11-14 VITALS — OXYGEN SATURATION: 100 %

## 2019-11-14 VITALS
TEMPERATURE: 100.1 F | OXYGEN SATURATION: 100 % | HEART RATE: 92 BPM | DIASTOLIC BLOOD PRESSURE: 67 MMHG | SYSTOLIC BLOOD PRESSURE: 105 MMHG

## 2019-11-14 VITALS — OXYGEN SATURATION: 92 %

## 2019-11-14 VITALS — OXYGEN SATURATION: 99 %

## 2019-11-14 VITALS — OXYGEN SATURATION: 98 %

## 2019-11-14 VITALS — OXYGEN SATURATION: 89 %

## 2019-11-14 VITALS — OXYGEN SATURATION: 85 %

## 2019-11-14 VITALS
DIASTOLIC BLOOD PRESSURE: 65 MMHG | SYSTOLIC BLOOD PRESSURE: 102 MMHG | TEMPERATURE: 98.7 F | OXYGEN SATURATION: 96 % | HEART RATE: 86 BPM

## 2019-11-14 VITALS
TEMPERATURE: 99.4 F | HEART RATE: 79 BPM | OXYGEN SATURATION: 100 % | DIASTOLIC BLOOD PRESSURE: 86 MMHG | SYSTOLIC BLOOD PRESSURE: 126 MMHG

## 2019-11-14 VITALS — OXYGEN SATURATION: 90 %

## 2019-11-14 VITALS — HEART RATE: 88 BPM | SYSTOLIC BLOOD PRESSURE: 117 MMHG | OXYGEN SATURATION: 100 % | DIASTOLIC BLOOD PRESSURE: 75 MMHG

## 2019-11-14 VITALS — OXYGEN SATURATION: 95 %

## 2019-11-14 VITALS — OXYGEN SATURATION: 70 %

## 2019-11-14 VITALS
DIASTOLIC BLOOD PRESSURE: 76 MMHG | SYSTOLIC BLOOD PRESSURE: 113 MMHG | OXYGEN SATURATION: 99 % | HEART RATE: 87 BPM | TEMPERATURE: 101.2 F

## 2019-11-14 VITALS — SYSTOLIC BLOOD PRESSURE: 120 MMHG | DIASTOLIC BLOOD PRESSURE: 75 MMHG | OXYGEN SATURATION: 100 % | HEART RATE: 90 BPM

## 2019-11-14 VITALS — OXYGEN SATURATION: 94 %

## 2019-11-14 VITALS — OXYGEN SATURATION: 97 %

## 2019-11-14 VITALS — OXYGEN SATURATION: 96 %

## 2019-11-14 VITALS — OXYGEN SATURATION: 93 %

## 2019-11-14 VITALS
HEART RATE: 71 BPM | TEMPERATURE: 98.5 F | OXYGEN SATURATION: 100 % | SYSTOLIC BLOOD PRESSURE: 113 MMHG | DIASTOLIC BLOOD PRESSURE: 67 MMHG

## 2019-11-14 VITALS — OXYGEN SATURATION: 86 %

## 2019-11-14 VITALS
HEART RATE: 88 BPM | SYSTOLIC BLOOD PRESSURE: 108 MMHG | DIASTOLIC BLOOD PRESSURE: 63 MMHG | OXYGEN SATURATION: 100 % | TEMPERATURE: 100.3 F

## 2019-11-14 VITALS — OXYGEN SATURATION: 87 %

## 2019-11-14 VITALS — OXYGEN SATURATION: 52 %

## 2019-11-14 VITALS — DIASTOLIC BLOOD PRESSURE: 78 MMHG | HEART RATE: 83 BPM | SYSTOLIC BLOOD PRESSURE: 106 MMHG

## 2019-11-14 VITALS — OXYGEN SATURATION: 91 %

## 2019-11-14 VITALS — OXYGEN SATURATION: 84 %

## 2019-11-14 VITALS — OXYGEN SATURATION: 77 %

## 2019-11-14 VITALS — HEART RATE: 85 BPM | DIASTOLIC BLOOD PRESSURE: 68 MMHG | SYSTOLIC BLOOD PRESSURE: 103 MMHG | OXYGEN SATURATION: 99 %

## 2019-11-14 VITALS — OXYGEN SATURATION: 83 %

## 2019-11-14 VITALS — OXYGEN SATURATION: 78 %

## 2019-11-14 LAB
ALBUMIN SERPL-MCNC: 2.5 GM/DL (ref 3.5–5)
ALP SERPL-CCNC: 72 U/L (ref 50–136)
ALT SERPL-CCNC: 40 U/L (ref 9–52)
ANION GAP SERPL CALC-SCNC: 4 MMOL/L (ref 7–16)
ANISOCYTOSIS BLD QL: (no result)
AST SERPL-CCNC: 40 U/L (ref 15–37)
BASE EXCESS BLDA CALC-SCNC: -0.2 MMOL/L (ref -2–2)
BILIRUB SERPL-MCNC: 0.5 MG/DL (ref 0–1)
BUN SERPL-MCNC: 12 MG/DL (ref 7–17)
CALCIUM SERPL-MCNC: 7.1 MG/DL (ref 8.4–10.2)
CHLORIDE SERPL-SCNC: 111 MMOL/L (ref 98–107)
CO2 BLDA-SCNC: 24.6 MMOL/L
CO2 SERPL-SCNC: 26 MMOL/L (ref 22–30)
CREAT SERPL-SCNC: 0.87 UMOL/L (ref 0.52–1.25)
DACRYOCYTES BLD QL SMEAR: (no result)
EOSINOPHIL NFR BLD: 2 % (ref 0–4)
ERYTHROCYTE [DISTWIDTH] IN BLOOD BY AUTOMATED COUNT: 15.6 % (ref 11.5–14.5)
GLUCOSE SERPL-MCNC: 97 MG/DL (ref 74–106)
HCO3 BLDA-SCNC: 23.5 MEQ/L (ref 22–26)
HCT VFR BLD AUTO: 20.8 % (ref 37–47)
HGB BLD-MCNC: 7.6 G/DL (ref 12.5–16)
INHALED O2 CONCENTRATION: 40 %
INR BLD: 1.6 (ref 0.8–3)
LYMPHOCYTES NFR BLD MANUAL: 32 % (ref 20–51)
MAGNESIUM SERPL-MCNC: 1.6 MG/DL (ref 1.6–2.3)
MCH RBC QN AUTO: 32 PG (ref 27–31)
MCHC RBC AUTO-ENTMCNC: 37 G/DL (ref 33–37)
MCV RBC AUTO: 88 FL (ref 80–100)
MONOCYTES NFR BLD: 18 % (ref 1.7–9.3)
NEUTS BAND NFR BLD: 7 % (ref 0–10)
NEUTS SEG NFR BLD MANUAL: 41 % (ref 42–75.2)
PCO2 BLDA: 34.3 MMHG (ref 35–45)
PHOSPHATE SERPL-MCNC: 1.7 MG/DL (ref 2.5–4.5)
PLATELET # BLD AUTO: 156 K/MM3 (ref 130–400)
PLATELET BLD QL SMEAR: NORMAL
PMV BLD AUTO: 11.7 FL (ref 7.4–10.4)
PO2 BLDA: 155.7 MMHG (ref 80–100)
POTASSIUM SERPL-SCNC: 3.4 MMOL/L (ref 3.4–5)
PROT SERPL-MCNC: 5.9 GM/DL (ref 6.4–8.2)
PROTHROMBIN TIME: 19.3 SECONDS (ref 9.7–12.8)
RBC # BLD AUTO: 2.36 M/MM3 (ref 4.1–5.3)
SAO2 % BLDA: 99 % (ref 92–100)
SODIUM SERPL-SCNC: 141 MMOL/L (ref 137–145)
TARGETS BLD QL SMEAR: (no result)

## 2019-11-14 NOTE — NUR
Patient indicates per whiteboard that she is having some pain and anxiety.
Refer to titration documentation. Fentanyl and propofol restarted at this
time.

## 2019-11-14 NOTE — NUR
Bedside shift report received from SAM Lopes. Patient is on ventilator,
sedated, and is calm at this time. Full assessment completed. Vital signs
stable. Bed in lowest position. Call light within reach. Side rails up x3.
Restraints assessed and secured.

## 2019-11-14 NOTE — NUR
PT has been following directions and communication by pointing at letters
written on clipboard. Asked how long she would be in the hospital and
intubated. Education provided on medications, length of stay, and POC for the
evening. Patient follows directions well, however did not answer appropriately
during her CAM-ICU assessment.

## 2019-11-14 NOTE — NUR
MSW student attended clinical rounds with the team. The patient is intubated
but alert. Patient will continue antibiotics.  will contiue to
monitor.

## 2019-11-15 VITALS — OXYGEN SATURATION: 99 %

## 2019-11-15 VITALS — OXYGEN SATURATION: 98 %

## 2019-11-15 VITALS — OXYGEN SATURATION: 91 %

## 2019-11-15 VITALS — OXYGEN SATURATION: 100 %

## 2019-11-15 VITALS — DIASTOLIC BLOOD PRESSURE: 73 MMHG | SYSTOLIC BLOOD PRESSURE: 126 MMHG | TEMPERATURE: 99.6 F | HEART RATE: 83 BPM

## 2019-11-15 VITALS — OXYGEN SATURATION: 85 %

## 2019-11-15 VITALS — OXYGEN SATURATION: 94 %

## 2019-11-15 VITALS — OXYGEN SATURATION: 97 %

## 2019-11-15 VITALS — OXYGEN SATURATION: 96 %

## 2019-11-15 VITALS
HEART RATE: 86 BPM | OXYGEN SATURATION: 100 % | DIASTOLIC BLOOD PRESSURE: 71 MMHG | SYSTOLIC BLOOD PRESSURE: 119 MMHG | TEMPERATURE: 99 F

## 2019-11-15 VITALS
OXYGEN SATURATION: 100 % | DIASTOLIC BLOOD PRESSURE: 76 MMHG | TEMPERATURE: 99.2 F | HEART RATE: 83 BPM | SYSTOLIC BLOOD PRESSURE: 129 MMHG

## 2019-11-15 VITALS — OXYGEN SATURATION: 88 %

## 2019-11-15 VITALS — HEART RATE: 88 BPM | SYSTOLIC BLOOD PRESSURE: 131 MMHG | OXYGEN SATURATION: 100 % | DIASTOLIC BLOOD PRESSURE: 80 MMHG

## 2019-11-15 VITALS — OXYGEN SATURATION: 100 % | HEART RATE: 81 BPM | DIASTOLIC BLOOD PRESSURE: 70 MMHG | SYSTOLIC BLOOD PRESSURE: 115 MMHG

## 2019-11-15 VITALS
DIASTOLIC BLOOD PRESSURE: 70 MMHG | TEMPERATURE: 98.8 F | OXYGEN SATURATION: 100 % | SYSTOLIC BLOOD PRESSURE: 115 MMHG | HEART RATE: 81 BPM

## 2019-11-15 VITALS
SYSTOLIC BLOOD PRESSURE: 113 MMHG | HEART RATE: 77 BPM | DIASTOLIC BLOOD PRESSURE: 68 MMHG | TEMPERATURE: 99 F | OXYGEN SATURATION: 100 %

## 2019-11-15 VITALS — OXYGEN SATURATION: 89 %

## 2019-11-15 VITALS — OXYGEN SATURATION: 95 %

## 2019-11-15 VITALS — OXYGEN SATURATION: 93 %

## 2019-11-15 VITALS
OXYGEN SATURATION: 100 % | HEART RATE: 76 BPM | DIASTOLIC BLOOD PRESSURE: 61 MMHG | TEMPERATURE: 99.9 F | SYSTOLIC BLOOD PRESSURE: 103 MMHG

## 2019-11-15 VITALS — OXYGEN SATURATION: 92 %

## 2019-11-15 VITALS — OXYGEN SATURATION: 77 %

## 2019-11-15 VITALS — OXYGEN SATURATION: 79 %

## 2019-11-15 LAB
ANION GAP SERPL CALC-SCNC: 5 MMOL/L (ref 7–16)
BASE EXCESS BLDA CALC-SCNC: 2.2 MMOL/L (ref -2–2)
BUN SERPL-MCNC: 16 MG/DL (ref 7–17)
CALCIUM SERPL-MCNC: 7.7 MG/DL (ref 8.4–10.2)
CHLORIDE SERPL-SCNC: 110 MMOL/L (ref 98–107)
CO2 BLDA-SCNC: 27 MMOL/L
CO2 SERPL-SCNC: 27 MMOL/L (ref 22–30)
CREAT SERPL-SCNC: 0.75 UMOL/L (ref 0.52–1.25)
EOSINOPHIL NFR BLD: 4 % (ref 0–4)
ERYTHROCYTE [DISTWIDTH] IN BLOOD BY AUTOMATED COUNT: 15.4 % (ref 11.5–14.5)
GLUCOSE SERPL-MCNC: 128 MG/DL (ref 74–106)
HCO3 BLDA-SCNC: 25.9 MEQ/L (ref 22–26)
HCT VFR BLD AUTO: 20.3 % (ref 37–47)
HGB BLD-MCNC: 7.3 G/DL (ref 12.5–16)
INHALED O2 CONCENTRATION: 40 %
LYMPHOCYTES NFR BLD MANUAL: 42 % (ref 20–51)
MCH RBC QN AUTO: 32 PG (ref 27–31)
MCHC RBC AUTO-ENTMCNC: 36 G/DL (ref 33–37)
MCV RBC AUTO: 88 FL (ref 80–100)
MONOCYTES NFR BLD: 9 % (ref 1.7–9.3)
MYELOCYTES NFR BLD MANUAL: 1 % (ref 0–0)
NEUTS BAND NFR BLD: 9 % (ref 0–10)
NEUTS SEG NFR BLD MANUAL: 35 % (ref 42–75.2)
PCO2 BLDA: 35 MMHG (ref 35–45)
PLATELET # BLD AUTO: 171 K/MM3 (ref 130–400)
PLATELET BLD QL SMEAR: NORMAL
PMV BLD AUTO: 12.2 FL (ref 7.4–10.4)
PO2 BLDA: 155.3 MMHG (ref 80–100)
POTASSIUM SERPL-SCNC: 3.7 MMOL/L (ref 3.4–5)
RBC # BLD AUTO: 2.3 M/MM3 (ref 4.1–5.3)
SAO2 % BLDA: 98.9 % (ref 92–100)
SODIUM SERPL-SCNC: 141 MMOL/L (ref 137–145)
TARGETS BLD QL SMEAR: (no result)

## 2019-11-15 NOTE — NUR
Pivot formula bottle and tube feeding tubing replaced. Patient resting
comfortably in bed, refused repositioning. PICC line infusing well, acevedo
draining to gravity. Will continue to monitor, call light within reach.

## 2019-11-15 NOTE — NUR
Pt is lethargic but opens eyes to name. Pt able to follow commands and ask
questions using letter board. Pt started on vent weaning trial. Will leave on
lower sedation as long as able.

## 2019-11-15 NOTE — NUR
AT BEDSIDE, VERBALLY ORDERED TO STOP SEDATION, PROPOFOL AND
FENTYNAL AS WELL AS TUBE FEEDINGS TO EXTUBATE PT.

## 2019-11-15 NOTE — NUR
PT FAILED NIF TEST, SO WAS NOT EXTUBATED AND PROPOFOL AND FENTANYL TURNED BACK
ON, AS WELL AS TUBE FEEDS.

## 2019-11-16 VITALS — OXYGEN SATURATION: 100 %

## 2019-11-16 VITALS
HEART RATE: 87 BPM | TEMPERATURE: 101 F | SYSTOLIC BLOOD PRESSURE: 142 MMHG | OXYGEN SATURATION: 100 % | DIASTOLIC BLOOD PRESSURE: 79 MMHG

## 2019-11-16 VITALS — OXYGEN SATURATION: 99 %

## 2019-11-16 VITALS
OXYGEN SATURATION: 100 % | TEMPERATURE: 97.4 F | SYSTOLIC BLOOD PRESSURE: 114 MMHG | DIASTOLIC BLOOD PRESSURE: 67 MMHG | HEART RATE: 93 BPM

## 2019-11-16 VITALS
OXYGEN SATURATION: 100 % | DIASTOLIC BLOOD PRESSURE: 89 MMHG | TEMPERATURE: 98.6 F | SYSTOLIC BLOOD PRESSURE: 148 MMHG | HEART RATE: 95 BPM

## 2019-11-16 VITALS — OXYGEN SATURATION: 98 %

## 2019-11-16 VITALS
DIASTOLIC BLOOD PRESSURE: 70 MMHG | TEMPERATURE: 98.6 F | HEART RATE: 90 BPM | SYSTOLIC BLOOD PRESSURE: 114 MMHG | OXYGEN SATURATION: 100 %

## 2019-11-16 VITALS — OXYGEN SATURATION: 85 %

## 2019-11-16 VITALS — OXYGEN SATURATION: 90 %

## 2019-11-16 VITALS — OXYGEN SATURATION: 89 %

## 2019-11-16 VITALS — OXYGEN SATURATION: 80 %

## 2019-11-16 VITALS
OXYGEN SATURATION: 100 % | DIASTOLIC BLOOD PRESSURE: 78 MMHG | TEMPERATURE: 99.1 F | HEART RATE: 97 BPM | SYSTOLIC BLOOD PRESSURE: 129 MMHG

## 2019-11-16 VITALS — OXYGEN SATURATION: 96 %

## 2019-11-16 VITALS — OXYGEN SATURATION: 97 %

## 2019-11-16 VITALS — OXYGEN SATURATION: 81 %

## 2019-11-16 VITALS — OXYGEN SATURATION: 92 %

## 2019-11-16 VITALS — OXYGEN SATURATION: 86 %

## 2019-11-16 VITALS — OXYGEN SATURATION: 84 %

## 2019-11-16 VITALS
TEMPERATURE: 98.6 F | OXYGEN SATURATION: 100 % | DIASTOLIC BLOOD PRESSURE: 85 MMHG | HEART RATE: 87 BPM | SYSTOLIC BLOOD PRESSURE: 136 MMHG

## 2019-11-16 VITALS — OXYGEN SATURATION: 83 %

## 2019-11-16 VITALS — OXYGEN SATURATION: 68 %

## 2019-11-16 VITALS — OXYGEN SATURATION: 77 %

## 2019-11-16 VITALS — OXYGEN SATURATION: 91 %

## 2019-11-16 VITALS — OXYGEN SATURATION: 95 %

## 2019-11-16 LAB
ALBUMIN SERPL-MCNC: 2.8 GM/DL (ref 3.5–5)
ALP SERPL-CCNC: 90 U/L (ref 50–136)
ALT SERPL-CCNC: 64 U/L (ref 9–52)
ANION GAP SERPL CALC-SCNC: 5 MMOL/L (ref 7–16)
ANISOCYTOSIS BLD QL: (no result)
AST SERPL-CCNC: 99 U/L (ref 15–37)
BASE EXCESS BLDA CALC-SCNC: 4.2 MMOL/L (ref -2–2)
BILIRUB SERPL-MCNC: 0.3 MG/DL (ref 0–1)
BUN SERPL-MCNC: 21 MG/DL (ref 7–17)
CALCIUM SERPL-MCNC: 8.1 MG/DL (ref 8.4–10.2)
CHLORIDE SERPL-SCNC: 106 MMOL/L (ref 98–107)
CO2 BLDA-SCNC: 26.8 MMOL/L
CO2 SERPL-SCNC: 27 MMOL/L (ref 22–30)
CREAT SERPL-SCNC: 0.83 UMOL/L (ref 0.52–1.25)
EOSINOPHIL NFR BLD: 3 % (ref 0–4)
ERYTHROCYTE [DISTWIDTH] IN BLOOD BY AUTOMATED COUNT: 15.7 % (ref 11.5–14.5)
GLUCOSE SERPL-MCNC: 116 MG/DL (ref 74–106)
HCO3 BLDA-SCNC: 26 MEQ/L (ref 22–26)
HCT VFR BLD AUTO: 19.8 % (ref 37–47)
HGB BLD-MCNC: 7.1 G/DL (ref 12.5–16)
INHALED O2 CONCENTRATION: 30 %
LYMPHOCYTES NFR BLD MANUAL: 39 % (ref 20–51)
MAGNESIUM SERPL-MCNC: 1.5 MG/DL (ref 1.6–2.3)
MCH RBC QN AUTO: 32 PG (ref 27–31)
MCHC RBC AUTO-ENTMCNC: 36 G/DL (ref 33–37)
MCV RBC AUTO: 89 FL (ref 80–100)
MONOCYTES NFR BLD: 14 % (ref 1.7–9.3)
NEUTS BAND NFR BLD: 5 % (ref 0–10)
NEUTS SEG NFR BLD MANUAL: 36 % (ref 42–75.2)
PCO2 BLDA: 27.9 MMHG (ref 35–45)
PHOSPHATE SERPL-MCNC: 2.2 MG/DL (ref 2.5–4.5)
PLATELET # BLD AUTO: 229 K/MM3 (ref 130–400)
PLATELET BLD QL SMEAR: NORMAL
PMV BLD AUTO: 11.5 FL (ref 7.4–10.4)
PO2 BLDA: 127.8 MMHG (ref 80–100)
POTASSIUM SERPL-SCNC: 3.5 MMOL/L (ref 3.4–5)
PRE ALBUMIN: 8.5 MG/DL (ref 17.6–36)
PROT SERPL-MCNC: 6.5 GM/DL (ref 6.4–8.2)
RBC # BLD AUTO: 2.23 M/MM3 (ref 4.1–5.3)
SAO2 % BLDA: 98.4 % (ref 92–100)
SODIUM SERPL-SCNC: 139 MMOL/L (ref 137–145)
TARGETS BLD QL SMEAR: (no result)

## 2019-11-16 NOTE — NUR
Shift assessment complete at this time. Plan of care reviewed at bedside with
patient et family. Additional time taken to address any other needs or
concerns. Pt denies pain at this time. Vitals stable. Bed in low position,
call light within reach. Will continue to monitor.

## 2019-11-16 NOTE — NUR
Patient still on vent weaning trial and sedation vacation which started at
0540. Tolerating well, reoriented to call light and room, encouraged to call
for assistance. Linn draining to gravity well, PICC infusing well, denies
pain or discomfort at this time.

## 2019-11-16 NOTE — NUR
Patient still on vent wean and sedation vacation-tolerating well, refused oral
care and repositioning, denies pain or discomfort. Bedside report given to
SAM Marshall. All tubes and lines reviewed, care transferred at this time.

## 2019-11-16 NOTE — NUR
PT REQUESTED TO BE PUT BACK ON THE ASSIST CONTROL MODE AS SHE IS TIRED AND
WANT TO SLEEP. PT IS BACK IN THE ASSIST CONTROL MODE AND MARION IT WELL, WITH NO
DISTRESS NOTED SHE IS ON THE DOCUMENTED SETTINGS AND IS RESTING COMFORTABLY IN
BED. WILL CONTINUE TO MONITOR AND ASSESS. PT IS ALSO REQUESTING NO ORAL AT
NIGHT AS SHE IS WANTING TO REST AND NOT HAVE ANY INTERUPTIONS.

## 2019-11-16 NOTE — NUR
Shift reassessment complete at this time. No changes from previous assessment
noted at this time. Pt denies pain or any other discomforts while on
ventilator. Vitals stable at this time. Bed in low et locked position, call
light within reach. Will continue to monitor.

## 2019-11-16 NOTE — NUR
PT IS CURRENTLY ON PSV 10/5 AT 30% FI02. PT IS ALERT, AND ORIENTED, FOLLOWS
COMMANDS AND IS IN NO DISTRESS. PT IS REQUESTING TO BE LEFT ON PSV AT THIS
TIME. SHE IS DOING WELL ON THE DOCUMENTED SETTINGS. PT IS IN NO DISTRESS AND I
WILL CONTINUE TO MONITOR AND ASSESS PT AND WILL SWITCH HER BACK OVER TO ACVC+
IF SHE BEGINS TO FEEL TIRED OR IS IN DISTRESS.

## 2019-11-16 NOTE — NUR
Patient resting in bed with eyes closed,
awakens to verbal stimuli, refused oral care and
repositioning. Linn draining to gravity, PICC line infusing well. Patient
denies pain or discomfort at this time, encouraged to call for assistance,
reoriented to call light.

## 2019-11-16 NOTE — NUR
Bedside report received from SAM Marshall. All tubes and lines reviewed, care
assumed at this time. Patient awake and alert without sedation or
restraints-tolerating vent wean well. Denies need for pain medication at this
time. Encouraged to use call light for assistance.

## 2019-11-17 VITALS — OXYGEN SATURATION: 100 %

## 2019-11-17 VITALS — OXYGEN SATURATION: 99 %

## 2019-11-17 VITALS — OXYGEN SATURATION: 98 %

## 2019-11-17 VITALS — OXYGEN SATURATION: 84 %

## 2019-11-17 VITALS — OXYGEN SATURATION: 97 %

## 2019-11-17 VITALS — OXYGEN SATURATION: 93 %

## 2019-11-17 VITALS
TEMPERATURE: 98.7 F | DIASTOLIC BLOOD PRESSURE: 78 MMHG | SYSTOLIC BLOOD PRESSURE: 138 MMHG | OXYGEN SATURATION: 100 % | HEART RATE: 88 BPM

## 2019-11-17 VITALS — OXYGEN SATURATION: 88 %

## 2019-11-17 VITALS — OXYGEN SATURATION: 63 %

## 2019-11-17 VITALS — OXYGEN SATURATION: 91 %

## 2019-11-17 VITALS — OXYGEN SATURATION: 90 %

## 2019-11-17 VITALS — OXYGEN SATURATION: 94 %

## 2019-11-17 VITALS — OXYGEN SATURATION: 92 %

## 2019-11-17 VITALS — OXYGEN SATURATION: 85 %

## 2019-11-17 VITALS — OXYGEN SATURATION: 89 %

## 2019-11-17 VITALS — OXYGEN SATURATION: 86 %

## 2019-11-17 VITALS — OXYGEN SATURATION: 73 %

## 2019-11-17 VITALS — OXYGEN SATURATION: 48 %

## 2019-11-17 VITALS — OXYGEN SATURATION: 87 %

## 2019-11-17 VITALS — OXYGEN SATURATION: 95 %

## 2019-11-17 VITALS — OXYGEN SATURATION: 78 %

## 2019-11-17 VITALS
DIASTOLIC BLOOD PRESSURE: 105 MMHG | OXYGEN SATURATION: 99 % | HEART RATE: 115 BPM | SYSTOLIC BLOOD PRESSURE: 172 MMHG | TEMPERATURE: 98.3 F

## 2019-11-17 VITALS — OXYGEN SATURATION: 96 %

## 2019-11-17 VITALS — OXYGEN SATURATION: 65 %

## 2019-11-17 VITALS — OXYGEN SATURATION: 82 %

## 2019-11-17 VITALS — OXYGEN SATURATION: 49 %

## 2019-11-17 VITALS — OXYGEN SATURATION: 79 %

## 2019-11-17 VITALS
TEMPERATURE: 99.1 F | OXYGEN SATURATION: 100 % | DIASTOLIC BLOOD PRESSURE: 93 MMHG | HEART RATE: 86 BPM | SYSTOLIC BLOOD PRESSURE: 110 MMHG

## 2019-11-17 VITALS
DIASTOLIC BLOOD PRESSURE: 76 MMHG | TEMPERATURE: 98.6 F | OXYGEN SATURATION: 99 % | HEART RATE: 87 BPM | SYSTOLIC BLOOD PRESSURE: 110 MMHG

## 2019-11-17 VITALS — OXYGEN SATURATION: 80 %

## 2019-11-17 VITALS — OXYGEN SATURATION: 69 %

## 2019-11-17 VITALS — OXYGEN SATURATION: 72 %

## 2019-11-17 VITALS — OXYGEN SATURATION: 42 %

## 2019-11-17 VITALS — OXYGEN SATURATION: 68 %

## 2019-11-17 VITALS
DIASTOLIC BLOOD PRESSURE: 85 MMHG | OXYGEN SATURATION: 88 % | SYSTOLIC BLOOD PRESSURE: 114 MMHG | HEART RATE: 91 BPM | TEMPERATURE: 98.9 F

## 2019-11-17 VITALS — OXYGEN SATURATION: 77 %

## 2019-11-17 VITALS — OXYGEN SATURATION: 71 %

## 2019-11-17 VITALS — OXYGEN SATURATION: 51 %

## 2019-11-17 VITALS — OXYGEN SATURATION: 67 %

## 2019-11-17 VITALS — OXYGEN SATURATION: 31 %

## 2019-11-17 VITALS
DIASTOLIC BLOOD PRESSURE: 68 MMHG | TEMPERATURE: 98.3 F | SYSTOLIC BLOOD PRESSURE: 124 MMHG | HEART RATE: 86 BPM | OXYGEN SATURATION: 100 %

## 2019-11-17 VITALS — OXYGEN SATURATION: 56 %

## 2019-11-17 VITALS — OXYGEN SATURATION: 83 %

## 2019-11-17 VITALS — OXYGEN SATURATION: 81 %

## 2019-11-17 VITALS — OXYGEN SATURATION: 57 %

## 2019-11-17 VITALS — OXYGEN SATURATION: 62 %

## 2019-11-17 VITALS — OXYGEN SATURATION: 59 %

## 2019-11-17 VITALS — OXYGEN SATURATION: 60 %

## 2019-11-17 VITALS — OXYGEN SATURATION: 66 %

## 2019-11-17 VITALS — OXYGEN SATURATION: 64 %

## 2019-11-17 LAB
ANION GAP SERPL CALC-SCNC: 6 MMOL/L (ref 7–16)
ANISOCYTOSIS BLD QL: (no result)
BASE EXCESS BLDA CALC-SCNC: 2.1 MMOL/L (ref -2–2)
BASE EXCESS BLDA CALC-SCNC: 3.9 MMOL/L (ref -2–2)
BUN SERPL-MCNC: 24 MG/DL (ref 7–17)
CALCIUM SERPL-MCNC: 8.2 MG/DL (ref 8.4–10.2)
CHLORIDE SERPL-SCNC: 105 MMOL/L (ref 98–107)
CO2 BLDA-SCNC: 26.7 MMOL/L
CO2 BLDA-SCNC: 27 MMOL/L
CO2 SERPL-SCNC: 28 MMOL/L (ref 22–30)
CREAT SERPL-SCNC: 0.85 UMOL/L (ref 0.52–1.25)
ERYTHROCYTE [DISTWIDTH] IN BLOOD BY AUTOMATED COUNT: 15.9 % (ref 11.5–14.5)
GLUCOSE SERPL-MCNC: 146 MG/DL (ref 74–106)
HCO3 BLDA-SCNC: 25.6 MEQ/L (ref 22–26)
HCO3 BLDA-SCNC: 26.1 MEQ/L (ref 22–26)
HCT VFR BLD AUTO: 20.9 % (ref 37–47)
HGB BLD-MCNC: 7.4 G/DL (ref 12.5–16)
INHALED O2 CONCENTRATION: 100 %
INHALED O2 CONCENTRATION: 30 %
LYMPHOCYTES NFR BLD MANUAL: 39 % (ref 20–51)
MAGNESIUM SERPL-MCNC: 2 MG/DL (ref 1.6–2.3)
MCH RBC QN AUTO: 31 PG (ref 27–31)
MCHC RBC AUTO-ENTMCNC: 35 G/DL (ref 33–37)
MCV RBC AUTO: 89 FL (ref 80–100)
MONOCYTES NFR BLD: 13 % (ref 1.7–9.3)
NEUTS BAND NFR BLD: 4 % (ref 0–10)
NEUTS SEG NFR BLD MANUAL: 44 % (ref 42–75.2)
PCO2 BLDA: 30 MMHG (ref 35–45)
PCO2 BLDA: 35.8 MMHG (ref 35–45)
PHOSPHATE SERPL-MCNC: 2.7 MG/DL (ref 2.5–4.5)
PLATELET # BLD AUTO: 287 K/MM3 (ref 130–400)
PLATELET BLD QL SMEAR: NORMAL
PMV BLD AUTO: 11 FL (ref 7.4–10.4)
PO2 BLDA: 112.3 MMHG (ref 80–100)
PO2 BLDA: 394.6 MMHG (ref 80–100)
POTASSIUM SERPL-SCNC: 3.3 MMOL/L (ref 3.4–5)
RBC # BLD AUTO: 2.36 M/MM3 (ref 4.1–5.3)
SAO2 % BLDA: 98.1 % (ref 92–100)
SAO2 % BLDA: 99.6 % (ref 92–100)
SODIUM SERPL-SCNC: 139 MMOL/L (ref 137–145)
TARGETS BLD QL SMEAR: (no result)

## 2019-11-17 NOTE — NUR
PT REQUESTING NO ORAL CARE AT NIGHT TIME. THEREFORE NONE HAS BEEN DONE. PT IS
RESTING WELL IN BED MARION WELL WITH NO DISTRSES NOTED AT THIS TIME

## 2019-11-17 NOTE — NUR
PT GIVEN 5 MG DECADRON NEBULIZED PER MD ORDER AFTER CONSULTING PHARMACY.
MARION WELL.  STRIDOR RESOLVED POST TX.   RR 28 SPO2 100% PT PLACED ON COOL
MIST FACE MASK POST TREATMENTS.

## 2019-11-17 NOTE — NUR
PT IS IN WEANING MODE AND IS MARION WELL WITH NO DISTRESS NOTED AT THIS TIME. SHE
IS ALERT AND ORIENTED AWAKE AND RESTING IN BED. PT IS SUCTIONING HERSELF AND
IS DOING GREAT. WILL CONTINUE TO MONITOR AND ASSESS. WILL NOTIFY DAY SHIFT RT.

## 2019-11-17 NOTE — NUR
Pt intubated at 1403 per direction of Dr. Ferrer by Dr. Avitia. Intubation
placement verified by color change, auscultation, and x-ray confirmation. 16
Fr OG placed at 1415 additionally. 20 mg of Etomidate and 100 mg of
Succinylcholine administered per direction of Dr. Avitia. Informed consent for
procedure obtained prior to intubation. Propofol gtt initatied post-intubation
for sedation management.

## 2019-11-17 NOTE — NUR
PT REINTUBATED AT 1403 BY DR ARAMBULA FOR INCREASED RESPIRATORY FAILURE POST
EXTUBATION.  SIZE 7.5 ETT SECURED AT 21CM @ TEETH WITH BLBS AND POSITIVE COLOR
CHANGE WITH CAPNOGRAPHY.  CHEST X-RAY PERFORMED AND CONFIRMED ETT PLACEMENT.
PT PLACED ON VENT SETTINGS PER DR ORR ORDER.  SUCTIONED LARGE AMOUNTS OF
PINK FROTHY SECRETIONS. VSS STABLE.

## 2019-11-17 NOTE — NUR
Patient on vent wean again starting at 0515, no sedation present since
yesterday, patient tolerating well, no restaints either, able to follow
commands and communicate needs with staff. Patient denies discomfort except
occasional cramping with diarrhea.

## 2019-11-17 NOTE — NUR
Shift reassessment complete at this time. Changes from previous assessment
noted in documentation. Vitals stable at this time. Pt denies pain or any
discomfort while on ventilator. Bed in low position, call light within reach,
will continue to monitor.

## 2019-11-17 NOTE — NUR
Pt extubated at this time to 5L OM. Pt tolerated extubation well with no
complaints or concerns raised. Will continue to monitor.

## 2019-11-17 NOTE — NUR
PT REFUSES ORAL CARE FROM NURSE AND RESPIRATORY THERAPIST OVERNIGHT BECAUSE
SHE DOES NOT WANT TO BE WOKEN UP.

## 2019-11-17 NOTE — NUR
Shift assessment complete at this time. Plan of care reviewed at bedside with
patient. Additional time taken to address any other needs or concerns. Vitals
stable at this time. Pt denies pain or any other discomforts on ventilator.
Bed in low position, call light within reach, will continue to monitor.

## 2019-11-17 NOTE — NUR
Shift reassessment complete at this time. Changes from previous assessment
noted in documentation. Pt denies pain at this time. Pt with noted BP et HR
elevation with increased stridor and respiratory distress. Dr. Ferrer at
bedside and has ordered steroid breathing treatments along with IV decadron.
Pt responding well to inital treatments with decreased stridor noted. Bed in
low position, call light within reach. Will continue to monitor.

## 2019-11-17 NOTE — NUR
PT EXTUBATED AT 1000 PER ORDER FROM DR ORR.  PT SUCTIONED ORALLY AND VIA
ETT PRIOR TO EXTUBATION.  PT PLACED ON 4L OXYMASK. HR 89, RR 22, SPO2 100%
BLBS CLEAR AND EQUAL.  NO STRIDOR.  PT SUCTIONED ORALLY POST EXTUBATION.  VSS.

## 2019-11-17 NOTE — NUR
Pt recently reintubated. No sedation vacation performed at this time. Pt
reports that she is comfortably resting at this sedation level. Pt also
refuses oral care at this time.

## 2019-11-18 VITALS — OXYGEN SATURATION: 98 %

## 2019-11-18 VITALS — OXYGEN SATURATION: 96 %

## 2019-11-18 VITALS — OXYGEN SATURATION: 99 %

## 2019-11-18 VITALS — OXYGEN SATURATION: 97 %

## 2019-11-18 VITALS — OXYGEN SATURATION: 100 %

## 2019-11-18 VITALS
OXYGEN SATURATION: 100 % | TEMPERATURE: 99.4 F | SYSTOLIC BLOOD PRESSURE: 100 MMHG | DIASTOLIC BLOOD PRESSURE: 71 MMHG | HEART RATE: 93 BPM

## 2019-11-18 VITALS — OXYGEN SATURATION: 94 %

## 2019-11-18 VITALS — OXYGEN SATURATION: 78 %

## 2019-11-18 VITALS — OXYGEN SATURATION: 84 %

## 2019-11-18 VITALS — OXYGEN SATURATION: 53 %

## 2019-11-18 VITALS — OXYGEN SATURATION: 95 %

## 2019-11-18 VITALS
DIASTOLIC BLOOD PRESSURE: 71 MMHG | TEMPERATURE: 98.4 F | SYSTOLIC BLOOD PRESSURE: 111 MMHG | OXYGEN SATURATION: 97 % | HEART RATE: 86 BPM

## 2019-11-18 VITALS
OXYGEN SATURATION: 100 % | SYSTOLIC BLOOD PRESSURE: 109 MMHG | HEART RATE: 88 BPM | DIASTOLIC BLOOD PRESSURE: 72 MMHG | TEMPERATURE: 99.2 F

## 2019-11-18 VITALS — OXYGEN SATURATION: 86 %

## 2019-11-18 VITALS — OXYGEN SATURATION: 91 %

## 2019-11-18 VITALS
SYSTOLIC BLOOD PRESSURE: 102 MMHG | HEART RATE: 85 BPM | TEMPERATURE: 97 F | OXYGEN SATURATION: 99 % | DIASTOLIC BLOOD PRESSURE: 67 MMHG

## 2019-11-18 VITALS — OXYGEN SATURATION: 93 %

## 2019-11-18 VITALS — OXYGEN SATURATION: 90 %

## 2019-11-18 VITALS — OXYGEN SATURATION: 89 %

## 2019-11-18 VITALS — OXYGEN SATURATION: 92 %

## 2019-11-18 VITALS — OXYGEN SATURATION: 87 %

## 2019-11-18 VITALS
SYSTOLIC BLOOD PRESSURE: 109 MMHG | HEART RATE: 98 BPM | OXYGEN SATURATION: 99 % | TEMPERATURE: 97.5 F | DIASTOLIC BLOOD PRESSURE: 68 MMHG

## 2019-11-18 VITALS
TEMPERATURE: 97.6 F | SYSTOLIC BLOOD PRESSURE: 102 MMHG | OXYGEN SATURATION: 95 % | DIASTOLIC BLOOD PRESSURE: 68 MMHG | HEART RATE: 72 BPM

## 2019-11-18 VITALS — OXYGEN SATURATION: 83 %

## 2019-11-18 VITALS — OXYGEN SATURATION: 76 %

## 2019-11-18 VITALS — OXYGEN SATURATION: 82 %

## 2019-11-18 VITALS — OXYGEN SATURATION: 81 %

## 2019-11-18 VITALS — OXYGEN SATURATION: 77 %

## 2019-11-18 VITALS — OXYGEN SATURATION: 85 %

## 2019-11-18 LAB
ALBUMIN SERPL-MCNC: 3.2 GM/DL (ref 3.5–5)
ALP SERPL-CCNC: 89 U/L (ref 50–136)
ALT SERPL-CCNC: 45 U/L (ref 9–52)
ANION GAP SERPL CALC-SCNC: 7 MMOL/L (ref 7–16)
AST SERPL-CCNC: 60 U/L (ref 15–37)
BASE EXCESS BLDA CALC-SCNC: 0.8 MMOL/L (ref -2–2)
BASE EXCESS BLDA CALC-SCNC: 2.2 MMOL/L (ref -2–2)
BASOPHILS # BLD: 0 10*3/UL (ref 0–0.2)
BASOPHILS NFR BLD AUTO: 0.1 % (ref 0–2)
BILIRUB SERPL-MCNC: 0.3 MG/DL (ref 0–1)
BUN SERPL-MCNC: 36 MG/DL (ref 7–17)
CALCIUM SERPL-MCNC: 8.6 MG/DL (ref 8.4–10.2)
CHLORIDE SERPL-SCNC: 107 MMOL/L (ref 98–107)
CO2 BLDA-SCNC: 24.6 MMOL/L
CO2 BLDA-SCNC: 24.9 MMOL/L
CO2 SERPL-SCNC: 26 MMOL/L (ref 22–30)
CREAT SERPL-SCNC: 0.84 UMOL/L (ref 0.52–1.25)
EOSINOPHIL # BLD: 0 10*3/UL (ref 0–0.7)
EOSINOPHIL NFR BLD: 0 % (ref 0–4)
ERYTHROCYTE [DISTWIDTH] IN BLOOD BY AUTOMATED COUNT: 15.8 % (ref 11.5–14.5)
GLUCOSE SERPL-MCNC: 195 MG/DL (ref 74–106)
GRANULOCYTES # BLD AUTO: 67.2 % (ref 42.2–75.2)
HCO3 BLDA-SCNC: 23.8 MEQ/L (ref 22–26)
HCO3 BLDA-SCNC: 23.9 MEQ/L (ref 22–26)
HCT VFR BLD AUTO: 21.6 % (ref 37–47)
HGB BLD-MCNC: 7.7 G/DL (ref 12.5–16)
INHALED O2 CONCENTRATION: 30 %
INHALED O2 CONCENTRATION: 40 %
LYMPHOCYTES # BLD: 2.3 10*3/UL (ref 1.2–3.4)
LYMPHOCYTES NFR BLD: 24.2 % (ref 20–51)
MAGNESIUM SERPL-MCNC: 2 MG/DL (ref 1.6–2.3)
MCH RBC QN AUTO: 31 PG (ref 27–31)
MCHC RBC AUTO-ENTMCNC: 36 G/DL (ref 33–37)
MCV RBC AUTO: 88 FL (ref 80–100)
MONOCYTES # BLD: 0.7 10*3/UL (ref 0.1–0.6)
MONOCYTES NFR BLD AUTO: 7.2 % (ref 1.7–9.3)
NEUTROPHILS # BLD: 6.3 10*3/UL (ref 1.4–6.5)
PCO2 BLDA: 26.6 MMHG (ref 35–45)
PCO2 BLDA: 31.6 MMHG (ref 35–45)
PHOSPHATE SERPL-MCNC: 1.9 MG/DL (ref 2.5–4.5)
PLATELET # BLD AUTO: 334 K/MM3 (ref 130–400)
PMV BLD AUTO: 11.5 FL (ref 7.4–10.4)
PO2 BLDA: 111.7 MMHG (ref 80–100)
PO2 BLDA: 140.5 MMHG (ref 80–100)
POTASSIUM SERPL-SCNC: 3.8 MMOL/L (ref 3.4–5)
PRE ALBUMIN: 10.6 MG/DL (ref 17.6–36)
PROT SERPL-MCNC: 7.3 GM/DL (ref 6.4–8.2)
RBC # BLD AUTO: 2.46 M/MM3 (ref 4.1–5.3)
SAO2 % BLDA: 98 % (ref 92–100)
SAO2 % BLDA: 98.5 % (ref 92–100)
SODIUM SERPL-SCNC: 140 MMOL/L (ref 137–145)

## 2019-11-18 NOTE — NUR
PT REQUESTED AT 1900 CHECK TO NOT BE DISTERBED WITH ORAL CARE WHEN SLEEPING
TONIGHT THEREFORE NO ORAL CARE WAS DONE AFTER PTS 1900 CHECK.

## 2019-11-18 NOTE — NUR
PICC intact right upper arm with sterile dressing change done with insertion
site cleansed with chloraprep x 1, chlorhexidine impregnated disk appiled,
skin prep, stat lock, and tegaderm applied. no signs or symptoms of IV
complications noted.

## 2019-11-18 NOTE — NUR
PT NOT ON WEANING TRIAL NEEDS TO BE INTUBATED FOR OVER 24 HOURS. NO DISTRESS
NOTED AT THIS TIME ON DOCUMENTED SETTINGS. WILL NOTIFY DAY SHIFT RT PT IS [facility]
WELL AND NO DISTRESS NOTED.

## 2019-11-19 VITALS — OXYGEN SATURATION: 98 %

## 2019-11-19 VITALS — OXYGEN SATURATION: 97 %

## 2019-11-19 VITALS — OXYGEN SATURATION: 99 %

## 2019-11-19 VITALS — OXYGEN SATURATION: 95 %

## 2019-11-19 VITALS — OXYGEN SATURATION: 96 %

## 2019-11-19 VITALS — OXYGEN SATURATION: 100 %

## 2019-11-19 VITALS — HEART RATE: 70 BPM | SYSTOLIC BLOOD PRESSURE: 102 MMHG | DIASTOLIC BLOOD PRESSURE: 66 MMHG | TEMPERATURE: 98 F

## 2019-11-19 VITALS — OXYGEN SATURATION: 82 %

## 2019-11-19 VITALS — OXYGEN SATURATION: 79 %

## 2019-11-19 VITALS — OXYGEN SATURATION: 94 %

## 2019-11-19 VITALS
DIASTOLIC BLOOD PRESSURE: 66 MMHG | TEMPERATURE: 98.4 F | SYSTOLIC BLOOD PRESSURE: 109 MMHG | HEART RATE: 72 BPM | OXYGEN SATURATION: 98 %

## 2019-11-19 VITALS — OXYGEN SATURATION: 84 %

## 2019-11-19 VITALS — OXYGEN SATURATION: 89 %

## 2019-11-19 VITALS — HEART RATE: 65 BPM | TEMPERATURE: 98.1 F | SYSTOLIC BLOOD PRESSURE: 112 MMHG | DIASTOLIC BLOOD PRESSURE: 69 MMHG

## 2019-11-19 VITALS — OXYGEN SATURATION: 75 %

## 2019-11-19 VITALS — HEART RATE: 61 BPM | TEMPERATURE: 98.1 F | SYSTOLIC BLOOD PRESSURE: 119 MMHG | DIASTOLIC BLOOD PRESSURE: 75 MMHG

## 2019-11-19 VITALS — DIASTOLIC BLOOD PRESSURE: 82 MMHG | HEART RATE: 64 BPM | SYSTOLIC BLOOD PRESSURE: 122 MMHG | TEMPERATURE: 97.5 F

## 2019-11-19 VITALS
HEART RATE: 68 BPM | OXYGEN SATURATION: 99 % | DIASTOLIC BLOOD PRESSURE: 66 MMHG | SYSTOLIC BLOOD PRESSURE: 104 MMHG | TEMPERATURE: 98.3 F

## 2019-11-19 VITALS — OXYGEN SATURATION: 92 %

## 2019-11-19 VITALS — OXYGEN SATURATION: 76 %

## 2019-11-19 VITALS — OXYGEN SATURATION: 64 %

## 2019-11-19 VITALS — OXYGEN SATURATION: 93 %

## 2019-11-19 VITALS — OXYGEN SATURATION: 86 %

## 2019-11-19 VITALS — OXYGEN SATURATION: 80 %

## 2019-11-19 VITALS — OXYGEN SATURATION: 87 %

## 2019-11-19 VITALS — OXYGEN SATURATION: 47 %

## 2019-11-19 VITALS — OXYGEN SATURATION: 48 %

## 2019-11-19 VITALS — OXYGEN SATURATION: 59 %

## 2019-11-19 VITALS — HEART RATE: 77 BPM | OXYGEN SATURATION: 88 % | SYSTOLIC BLOOD PRESSURE: 141 MMHG | DIASTOLIC BLOOD PRESSURE: 93 MMHG

## 2019-11-19 VITALS — OXYGEN SATURATION: 88 %

## 2019-11-19 VITALS — OXYGEN SATURATION: 78 %

## 2019-11-19 VITALS — OXYGEN SATURATION: 55 %

## 2019-11-19 VITALS — OXYGEN SATURATION: 83 %

## 2019-11-19 VITALS — OXYGEN SATURATION: 85 %

## 2019-11-19 VITALS — OXYGEN SATURATION: 62 %

## 2019-11-19 VITALS — OXYGEN SATURATION: 69 %

## 2019-11-19 VITALS — OXYGEN SATURATION: 90 %

## 2019-11-19 VITALS — OXYGEN SATURATION: 74 %

## 2019-11-19 VITALS — OXYGEN SATURATION: 81 %

## 2019-11-19 VITALS — OXYGEN SATURATION: 91 %

## 2019-11-19 VITALS — OXYGEN SATURATION: 58 %

## 2019-11-19 VITALS — OXYGEN SATURATION: 71 %

## 2019-11-19 VITALS — OXYGEN SATURATION: 77 %

## 2019-11-19 VITALS — OXYGEN SATURATION: 65 %

## 2019-11-19 VITALS — OXYGEN SATURATION: 68 %

## 2019-11-19 VITALS — OXYGEN SATURATION: 72 %

## 2019-11-19 VITALS — OXYGEN SATURATION: 66 %

## 2019-11-19 VITALS — OXYGEN SATURATION: 49 %

## 2019-11-19 LAB
ALBUMIN SERPL-MCNC: 3 GM/DL (ref 3.5–5)
ALP SERPL-CCNC: 94 U/L (ref 50–136)
ALT SERPL-CCNC: 37 U/L (ref 9–52)
ANION GAP SERPL CALC-SCNC: 6 MMOL/L (ref 7–16)
ANISOCYTOSIS BLD QL: (no result)
AST SERPL-CCNC: 53 U/L (ref 15–37)
BASE EXCESS BLDA CALC-SCNC: 3.8 MMOL/L (ref -2–2)
BILIRUB SERPL-MCNC: 0.4 MG/DL (ref 0–1)
BUN SERPL-MCNC: 44 MG/DL (ref 7–17)
CALCIUM SERPL-MCNC: 8.5 MG/DL (ref 8.4–10.2)
CHLORIDE SERPL-SCNC: 109 MMOL/L (ref 98–107)
CO2 BLDA-SCNC: 28.7 MMOL/L
CO2 SERPL-SCNC: 29 MMOL/L (ref 22–30)
CREAT SERPL-SCNC: 0.78 UMOL/L (ref 0.52–1.25)
ERYTHROCYTE [DISTWIDTH] IN BLOOD BY AUTOMATED COUNT: 16.3 % (ref 11.5–14.5)
GLUCOSE SERPL-MCNC: 220 MG/DL (ref 74–106)
HCO3 BLDA-SCNC: 27.6 MEQ/L (ref 22–26)
HCT VFR BLD AUTO: 22 % (ref 37–47)
HGB BLD-MCNC: 7.8 G/DL (ref 12.5–16)
INHALED O2 CONCENTRATION: 30 %
LYMPHOCYTES NFR BLD MANUAL: 9 % (ref 20–51)
MAGNESIUM SERPL-MCNC: 2.1 MG/DL (ref 1.6–2.3)
MCH RBC QN AUTO: 32 PG (ref 27–31)
MCHC RBC AUTO-ENTMCNC: 36 G/DL (ref 33–37)
MCV RBC AUTO: 89 FL (ref 80–100)
METAMYELOCYTES NFR BLD MANUAL: 2 % (ref 0–0)
MONOCYTES NFR BLD: 2 % (ref 1.7–9.3)
NEUTS BAND NFR BLD: 24 % (ref 0–10)
NEUTS SEG NFR BLD MANUAL: 63 % (ref 42–75.2)
PCO2 BLDA: 38.3 MMHG (ref 35–45)
PHOSPHATE SERPL-MCNC: 2.9 MG/DL (ref 2.5–4.5)
PLATELET # BLD AUTO: 382 K/MM3 (ref 130–400)
PLATELET BLD QL SMEAR: NORMAL
PMV BLD AUTO: 11.3 FL (ref 7.4–10.4)
PO2 BLDA: 112.2 MMHG (ref 80–100)
POTASSIUM SERPL-SCNC: 4 MMOL/L (ref 3.4–5)
PROT SERPL-MCNC: 7 GM/DL (ref 6.4–8.2)
RBC # BLD AUTO: 2.47 M/MM3 (ref 4.1–5.3)
SAO2 % BLDA: 98.1 % (ref 92–100)
SODIUM SERPL-SCNC: 144 MMOL/L (ref 137–145)
TARGETS BLD QL SMEAR: (no result)

## 2019-11-19 NOTE — NUR
PT TACHYPNEIC AND ANXIOUS. PT'S INSP VOLUME TOO POOR TO EXTUBATE PATIENT
ACCORDING TO DR RUGGIERO.  WILL NOT EXTUBATE TODAY.  WILL ATTEMPT AGAIN TOMORROW
AM.  DR RUGGIERO STATES TO RESUME SEDATION AND PREVIOUS VENT SETTINGS.

## 2019-11-19 NOTE — NUR
PT CHANGED TO CPAP TRIAL OF 8/5+ AT THIS TIME PER DR. RUGGIERO FOR ONE MORE
ATTEMPT AT WEANING.  PLEASE SEE ADDITIONAL CHARTING.

## 2019-11-19 NOTE — NUR
PT AWAKE, ALERT AND ORIENTED, FOLLOWS COMMANDS APPROPRIATELY AND COMMUNICATES
THROUGH WRITING, DENIES ANY PAIN OR DISCOMFORT AT THIS TIME. WILL CONTINUE TO
MONITOR.

## 2019-11-19 NOTE — NUR
DUE TO PATIENT DECREASED INSP VOLUMES WHILE ON CPAP TRIAL PROPOFOL AND FENT
GTTS PLACED ON STANDBY.  PATIENT COMMUNICATED TO DR RUGGIERO THAT EVEN IF SHE
ISN'T READY TO BE EXTUBATED FROM HIS STANDPOINT SHE STILL WANTS THE ETT TAKEN
OUT AND SHE DOES NOT WANT TO BE REINTUBTED. DR RUGGIERO ASKED PATIENT IF SHE HAS
COMMUNICATED THESE WISHES TO HER . PATIENT SHOOK HER HEAD NO.  I CALLED
SEVERIANO TO ASK IF HE COULD COME TO THE HOSPITAL EARLY SO PATIENT COULD
COMMUNICATE HER WISHES TO HIM PRIOR TO US DOING ANYTHING SUCH AS EXTUBATING
HER.  SEVERIANO STATES HE WILL BE HERE IN 30 MINS.  PLAN EXPLAINED TO PATIENT THAT
WE WILL GIVE HER A 30 MINUTE BREAK AND ALLOW HER TO FULLY WAKE UP AND THEN IN
30 MINS WE WILL ATTEMPT CPAP TRIAL AGAIN.  PATIENT NODS TO EXPRESS
UNDERSTANDING OF PLAN.

## 2019-11-19 NOTE — NUR
SEDATION VACATION NOT PERFORMED AS PT ALREADY AWAKE, ALERT AND ORIENTED,
FOLLOWING COMMANDS AND COMMUNICATES NEED THROUGH WRITING. PT DENIES ANY PAIN
AT THIS TIME. WILL CONTINUE TO MONITOR.

## 2019-11-19 NOTE — NUR
PT RECEIVED ON CPAP 6/5 VT WERE UNDER 300 AND RR 30+.  AT THIS TIME PT WAS
CHANGED BACK TO AC/VC+.  PLEASE SEE ADDITIONAL CHARTING.

## 2019-11-20 VITALS — OXYGEN SATURATION: 33 %

## 2019-11-20 VITALS — OXYGEN SATURATION: 100 %

## 2019-11-20 VITALS — OXYGEN SATURATION: 83 %

## 2019-11-20 VITALS — OXYGEN SATURATION: 97 %

## 2019-11-20 VITALS — OXYGEN SATURATION: 99 %

## 2019-11-20 VITALS — OXYGEN SATURATION: 41 %

## 2019-11-20 VITALS — OXYGEN SATURATION: 72 %

## 2019-11-20 VITALS — OXYGEN SATURATION: 95 %

## 2019-11-20 VITALS — OXYGEN SATURATION: 76 %

## 2019-11-20 VITALS
HEART RATE: 66 BPM | TEMPERATURE: 98.4 F | OXYGEN SATURATION: 97 % | SYSTOLIC BLOOD PRESSURE: 117 MMHG | DIASTOLIC BLOOD PRESSURE: 70 MMHG

## 2019-11-20 VITALS — OXYGEN SATURATION: 67 %

## 2019-11-20 VITALS — OXYGEN SATURATION: 92 %

## 2019-11-20 VITALS — OXYGEN SATURATION: 98 %

## 2019-11-20 VITALS — OXYGEN SATURATION: 58 %

## 2019-11-20 VITALS — OXYGEN SATURATION: 90 %

## 2019-11-20 VITALS
OXYGEN SATURATION: 99 % | TEMPERATURE: 97.9 F | SYSTOLIC BLOOD PRESSURE: 118 MMHG | HEART RATE: 75 BPM | DIASTOLIC BLOOD PRESSURE: 85 MMHG

## 2019-11-20 VITALS — OXYGEN SATURATION: 62 %

## 2019-11-20 VITALS — OXYGEN SATURATION: 77 %

## 2019-11-20 VITALS — OXYGEN SATURATION: 81 %

## 2019-11-20 VITALS
OXYGEN SATURATION: 99 % | TEMPERATURE: 97.8 F | DIASTOLIC BLOOD PRESSURE: 66 MMHG | SYSTOLIC BLOOD PRESSURE: 109 MMHG | HEART RATE: 60 BPM

## 2019-11-20 VITALS — OXYGEN SATURATION: 73 %

## 2019-11-20 VITALS — OXYGEN SATURATION: 94 %

## 2019-11-20 VITALS — OXYGEN SATURATION: 40 %

## 2019-11-20 VITALS — OXYGEN SATURATION: 96 %

## 2019-11-20 VITALS — OXYGEN SATURATION: 52 %

## 2019-11-20 VITALS — OXYGEN SATURATION: 70 %

## 2019-11-20 VITALS — OXYGEN SATURATION: 56 %

## 2019-11-20 VITALS — OXYGEN SATURATION: 91 %

## 2019-11-20 VITALS — OXYGEN SATURATION: 61 %

## 2019-11-20 VITALS
HEART RATE: 65 BPM | OXYGEN SATURATION: 100 % | DIASTOLIC BLOOD PRESSURE: 84 MMHG | TEMPERATURE: 98 F | SYSTOLIC BLOOD PRESSURE: 119 MMHG

## 2019-11-20 VITALS — OXYGEN SATURATION: 85 %

## 2019-11-20 VITALS — OXYGEN SATURATION: 86 %

## 2019-11-20 VITALS — OXYGEN SATURATION: 69 %

## 2019-11-20 VITALS — OXYGEN SATURATION: 89 %

## 2019-11-20 VITALS — OXYGEN SATURATION: 93 %

## 2019-11-20 VITALS — OXYGEN SATURATION: 42 %

## 2019-11-20 VITALS — TEMPERATURE: 98.6 F | DIASTOLIC BLOOD PRESSURE: 81 MMHG | HEART RATE: 81 BPM | SYSTOLIC BLOOD PRESSURE: 127 MMHG

## 2019-11-20 VITALS — OXYGEN SATURATION: 75 %

## 2019-11-20 VITALS — OXYGEN SATURATION: 82 %

## 2019-11-20 VITALS — OXYGEN SATURATION: 57 %

## 2019-11-20 VITALS — OXYGEN SATURATION: 50 %

## 2019-11-20 VITALS
TEMPERATURE: 98.3 F | DIASTOLIC BLOOD PRESSURE: 63 MMHG | HEART RATE: 67 BPM | SYSTOLIC BLOOD PRESSURE: 126 MMHG | OXYGEN SATURATION: 93 %

## 2019-11-20 VITALS — OXYGEN SATURATION: 88 %

## 2019-11-20 VITALS — OXYGEN SATURATION: 87 %

## 2019-11-20 VITALS — OXYGEN SATURATION: 59 %

## 2019-11-20 VITALS — OXYGEN SATURATION: 63 %

## 2019-11-20 VITALS — OXYGEN SATURATION: 32 %

## 2019-11-20 VITALS — OXYGEN SATURATION: 55 %

## 2019-11-20 VITALS — OXYGEN SATURATION: 60 %

## 2019-11-20 VITALS — OXYGEN SATURATION: 53 %

## 2019-11-20 VITALS — OXYGEN SATURATION: 47 %

## 2019-11-20 VITALS — OXYGEN SATURATION: 78 %

## 2019-11-20 VITALS — OXYGEN SATURATION: 46 %

## 2019-11-20 VITALS — OXYGEN SATURATION: 84 %

## 2019-11-20 VITALS — OXYGEN SATURATION: 31 %

## 2019-11-20 VITALS — OXYGEN SATURATION: 71 %

## 2019-11-20 VITALS — OXYGEN SATURATION: 43 %

## 2019-11-20 VITALS — OXYGEN SATURATION: 54 %

## 2019-11-20 VITALS — OXYGEN SATURATION: 79 %

## 2019-11-20 VITALS — OXYGEN SATURATION: 68 %

## 2019-11-20 LAB
ALBUMIN SERPL-MCNC: 3.1 GM/DL (ref 3.5–5)
ALP SERPL-CCNC: 103 U/L (ref 50–136)
ALT SERPL-CCNC: 47 U/L (ref 9–52)
ANION GAP SERPL CALC-SCNC: 7 MMOL/L (ref 7–16)
ANISOCYTOSIS BLD QL: (no result)
AST SERPL-CCNC: 53 U/L (ref 15–37)
BASE EXCESS BLDA CALC-SCNC: 1.1 MMOL/L (ref -2–2)
BASE EXCESS BLDA CALC-SCNC: 2.4 MMOL/L (ref -2–2)
BASE EXCESS BLDA CALC-SCNC: 2.5 MMOL/L (ref -2–2)
BILIRUB SERPL-MCNC: 0.3 MG/DL (ref 0–1)
BUN SERPL-MCNC: 52 MG/DL (ref 7–17)
CALCIUM SERPL-MCNC: 8.6 MG/DL (ref 8.4–10.2)
CHLORIDE SERPL-SCNC: 108 MMOL/L (ref 98–107)
CO2 BLDA-SCNC: 24.8 MMOL/L
CO2 BLDA-SCNC: 27.7 MMOL/L
CO2 BLDA-SCNC: 28.1 MMOL/L
CO2 SERPL-SCNC: 29 MMOL/L (ref 22–30)
CREAT SERPL-SCNC: 0.85 UMOL/L (ref 0.52–1.25)
ERYTHROCYTE [DISTWIDTH] IN BLOOD BY AUTOMATED COUNT: 16.3 % (ref 11.5–14.5)
GLUCOSE SERPL-MCNC: 214 MG/DL (ref 74–106)
HCO3 BLDA-SCNC: 23.9 MEQ/L (ref 22–26)
HCO3 BLDA-SCNC: 26.5 MEQ/L (ref 22–26)
HCO3 BLDA-SCNC: 26.8 MEQ/L (ref 22–26)
HCT VFR BLD AUTO: 25.4 % (ref 37–47)
HGB BLD-MCNC: 8.7 G/DL (ref 12.5–16)
INHALED O2 CONCENTRATION: 30 %
INHALED O2 CONCENTRATION: 30 %
LYMPHOCYTES NFR BLD MANUAL: 14 % (ref 20–51)
MAGNESIUM SERPL-MCNC: 2.2 MG/DL (ref 1.6–2.3)
MCH RBC QN AUTO: 31 PG (ref 27–31)
MCHC RBC AUTO-ENTMCNC: 34 G/DL (ref 33–37)
MCV RBC AUTO: 89 FL (ref 80–100)
NEUTS BAND NFR BLD: 3 % (ref 0–10)
NEUTS SEG NFR BLD MANUAL: 83 % (ref 42–75.2)
NRBC BLD AUTO-RTO: 3 % (ref 0–6)
PCO2 BLDA: 30.2 MMHG (ref 35–45)
PCO2 BLDA: 39.3 MMHG (ref 35–45)
PCO2 BLDA: 40.5 MMHG (ref 35–45)
PHOSPHATE SERPL-MCNC: 3.1 MG/DL (ref 2.5–4.5)
PLATELET # BLD AUTO: 414 K/MM3 (ref 130–400)
PLATELET BLD QL SMEAR: (no result)
PMV BLD AUTO: 11.9 FL (ref 7.4–10.4)
PO2 BLDA: 105.1 MMHG (ref 80–100)
PO2 BLDA: 118.3 MMHG (ref 80–100)
PO2 BLDA: 92.9 MMHG (ref 80–100)
POTASSIUM SERPL-SCNC: 3.9 MMOL/L (ref 3.4–5)
PROT SERPL-MCNC: 7.3 GM/DL (ref 6.4–8.2)
RBC # BLD AUTO: 2.84 M/MM3 (ref 4.1–5.3)
SAO2 % BLDA: 96.7 % (ref 92–100)
SAO2 % BLDA: 98.2 % (ref 92–100)
SODIUM SERPL-SCNC: 144 MMOL/L (ref 137–145)
TARGETS BLD QL SMEAR: (no result)

## 2019-11-20 NOTE — NUR
SEDATION VACATION NOT PERFORMED AS PT AWAKE AND RESTING COMFORTABLY,
COMMUNICATING THROUGH WRITING.
PT
FOLLOWS COMMANDS APPROPRIATELY AND DENIES ANY PAIN OR DISCOMFORT AT THIS TIME.
WILL CONTINUE TO MONITOR.

## 2019-11-20 NOTE — NUR
Patient is sitting upright in recliner at this time. Patient's voice is hoarse
although understandable. Patient has been taking clear liquids today and is
now on AHA diet as tolerated. Patient requests that oral medications be
crushed in pudding at this time. Medications are tolerated somewhat well;
patient has a mild dry cough following sips of water with medication. Will
continue to monitor.

## 2019-11-20 NOTE — NUR
PT EXTUBATED PER DR. RUGGIERO
TO 8 LPM OXYMASK WITHOUT COMPLICATION; RN PRESENT.
95%, HR 80, RR 24. NO STRIDOR NOTED AT THIS TIME.
BILATERAL BREATHSOUNDS CLEAR AND EQUAL.

## 2019-11-20 NOTE — NUR
Physical therapy is recommending post acute rehab. MSW student met with
patient and her  Jorge to present  [email or web address]'s list for IPR and
SNF. They will look it over and let me know in the morning. 
will continue to follow to ensure a safe discharge.

## 2019-11-21 VITALS — OXYGEN SATURATION: 86 %

## 2019-11-21 VITALS — OXYGEN SATURATION: 98 %

## 2019-11-21 VITALS — OXYGEN SATURATION: 99 %

## 2019-11-21 VITALS — OXYGEN SATURATION: 92 %

## 2019-11-21 VITALS — OXYGEN SATURATION: 91 %

## 2019-11-21 VITALS — TEMPERATURE: 98 F | SYSTOLIC BLOOD PRESSURE: 123 MMHG | DIASTOLIC BLOOD PRESSURE: 76 MMHG | HEART RATE: 75 BPM

## 2019-11-21 VITALS — OXYGEN SATURATION: 93 %

## 2019-11-21 VITALS — OXYGEN SATURATION: 100 %

## 2019-11-21 VITALS — OXYGEN SATURATION: 66 %

## 2019-11-21 VITALS — OXYGEN SATURATION: 85 %

## 2019-11-21 VITALS — OXYGEN SATURATION: 97 %

## 2019-11-21 VITALS — SYSTOLIC BLOOD PRESSURE: 119 MMHG | TEMPERATURE: 98.2 F | HEART RATE: 88 BPM | DIASTOLIC BLOOD PRESSURE: 72 MMHG

## 2019-11-21 VITALS — OXYGEN SATURATION: 80 %

## 2019-11-21 VITALS — OXYGEN SATURATION: 90 %

## 2019-11-21 VITALS — SYSTOLIC BLOOD PRESSURE: 110 MMHG | TEMPERATURE: 98.3 F | HEART RATE: 75 BPM | DIASTOLIC BLOOD PRESSURE: 65 MMHG

## 2019-11-21 VITALS — OXYGEN SATURATION: 95 %

## 2019-11-21 VITALS — OXYGEN SATURATION: 84 %

## 2019-11-21 VITALS — OXYGEN SATURATION: 94 %

## 2019-11-21 VITALS — OXYGEN SATURATION: 88 %

## 2019-11-21 VITALS — OXYGEN SATURATION: 48 %

## 2019-11-21 VITALS — OXYGEN SATURATION: 87 %

## 2019-11-21 VITALS — OXYGEN SATURATION: 67 %

## 2019-11-21 VITALS
DIASTOLIC BLOOD PRESSURE: 79 MMHG | TEMPERATURE: 98.3 F | SYSTOLIC BLOOD PRESSURE: 130 MMHG | HEART RATE: 76 BPM | OXYGEN SATURATION: 97 %

## 2019-11-21 VITALS — OXYGEN SATURATION: 75 %

## 2019-11-21 VITALS — SYSTOLIC BLOOD PRESSURE: 127 MMHG | HEART RATE: 84 BPM | TEMPERATURE: 97.8 F | DIASTOLIC BLOOD PRESSURE: 84 MMHG

## 2019-11-21 VITALS — OXYGEN SATURATION: 96 %

## 2019-11-21 VITALS — OXYGEN SATURATION: 89 %

## 2019-11-21 VITALS — OXYGEN SATURATION: 83 %

## 2019-11-21 VITALS — OXYGEN SATURATION: 79 %

## 2019-11-21 VITALS — OXYGEN SATURATION: 51 %

## 2019-11-21 VITALS — OXYGEN SATURATION: 31 %

## 2019-11-21 VITALS — OXYGEN SATURATION: 77 %

## 2019-11-21 VITALS — SYSTOLIC BLOOD PRESSURE: 127 MMHG | TEMPERATURE: 98 F | DIASTOLIC BLOOD PRESSURE: 84 MMHG | HEART RATE: 61 BPM

## 2019-11-21 VITALS — OXYGEN SATURATION: 72 %

## 2019-11-21 VITALS — OXYGEN SATURATION: 74 %

## 2019-11-21 VITALS — OXYGEN SATURATION: 45 %

## 2019-11-21 VITALS — OXYGEN SATURATION: 82 %

## 2019-11-21 VITALS — OXYGEN SATURATION: 81 %

## 2019-11-21 VITALS — OXYGEN SATURATION: 60 %

## 2019-11-21 VITALS — OXYGEN SATURATION: 70 %

## 2019-11-21 LAB
ALBUMIN SERPL-MCNC: 3.5 GM/DL (ref 3.5–5)
ALP SERPL-CCNC: 96 U/L (ref 50–136)
ALT SERPL-CCNC: 59 U/L (ref 9–52)
ANION GAP SERPL CALC-SCNC: 7 MMOL/L (ref 7–16)
AST SERPL-CCNC: 68 U/L (ref 15–37)
BILIRUB SERPL-MCNC: 0.3 MG/DL (ref 0–1)
BUN SERPL-MCNC: 51 MG/DL (ref 7–17)
CALCIUM SERPL-MCNC: 9.3 MG/DL (ref 8.4–10.2)
CHLORIDE SERPL-SCNC: 106 MMOL/L (ref 98–107)
CO2 SERPL-SCNC: 30 MMOL/L (ref 22–30)
CREAT SERPL-SCNC: 0.9 UMOL/L (ref 0.52–1.25)
ERYTHROCYTE [DISTWIDTH] IN BLOOD BY AUTOMATED COUNT: 17.7 % (ref 11.5–14.5)
GLUCOSE SERPL-MCNC: 242 MG/DL (ref 74–106)
HCT VFR BLD AUTO: 27.4 % (ref 37–47)
HGB BLD-MCNC: 9.4 G/DL (ref 12.5–16)
HYPOCHROMIA BLD QL SMEAR: (no result)
LYMPHOCYTES NFR BLD MANUAL: 19 % (ref 20–51)
MAGNESIUM SERPL-MCNC: 2.1 MG/DL (ref 1.6–2.3)
MCH RBC QN AUTO: 31 PG (ref 27–31)
MCHC RBC AUTO-ENTMCNC: 34 G/DL (ref 33–37)
MCV RBC AUTO: 91 FL (ref 80–100)
METAMYELOCYTES NFR BLD MANUAL: 1 % (ref 0–0)
MONOCYTES NFR BLD: 4 % (ref 1.7–9.3)
MYELOCYTES NFR BLD MANUAL: 1 % (ref 0–0)
NEUTS BAND NFR BLD: 3 % (ref 0–10)
NEUTS SEG NFR BLD MANUAL: 72 % (ref 42–75.2)
PHOSPHATE SERPL-MCNC: 4.2 MG/DL (ref 2.5–4.5)
PLATELET # BLD AUTO: 441 K/MM3 (ref 130–400)
PLATELET BLD QL SMEAR: NORMAL
PMV BLD AUTO: 12 FL (ref 7.4–10.4)
POTASSIUM SERPL-SCNC: 4.1 MMOL/L (ref 3.4–5)
PROT SERPL-MCNC: 7.7 GM/DL (ref 6.4–8.2)
RBC # BLD AUTO: 3.01 M/MM3 (ref 4.1–5.3)
SODIUM SERPL-SCNC: 144 MMOL/L (ref 137–145)
TARGETS BLD QL SMEAR: (no result)

## 2019-11-21 NOTE — NUR
Pt resting comfortably in chair. Vitals stable at this time. Pt denies pain or
any other discomforts. Call light within reach, will continue to monitor.

## 2019-11-21 NOTE — NUR
Yuniel from [facility] reports that the patient's PCP needs to send an authorization
so [facility] can be in-network. If the PCP does this the patient will be
responsible for a $30 a day co-pay. If there is no authorization from the PCP
there is a $300 deductible plus a 50% percent cost share.  will
continue to follow.

## 2019-11-21 NOTE — NUR
Patient transferred to medical from ICU- Alert/oriented, talks very
quiet/whispers, requesting juice- given with nectar thick ,, will be NPO after
MN for Lexiscan, denies pain, denies SOB, on Room air, Linn with clear yellow
urine-on Contact Isolation.

## 2019-11-21 NOTE — NUR
Patient's rectal tube removed prior to transfer to medical. Patient was
transferred to room 354 via wheelchair. Contact was made with receiving nurse.

## 2019-11-21 NOTE — NUR
Pt resting comfortably in chair. Pt denies pain or any other discomfort.
Vitals stable at this time. Bed in low position, call light within reach. Will
continue to monitor.

## 2019-11-21 NOTE — NUR
Shift assessment complete at this time. Additional time taken to address any
other needs. Plan of care reviewed at bedside with patient. Vitals stable at
this time. Pt denies pain or any other discomforts. Bed in low position, call
light within reach. will continue to monitor.

## 2019-11-21 NOTE — NUR
MSW student met with the patient and her  Jorge to discuss post acute
rehab. MSW student presented the patient choice form and after reviewing
[email or web address]'s list the first choice is [facility] and second choice is [facility].
MSW student faxed referral to Yuniel at [facility] and contacted JOHN Samano Director
to inform her. Social serivces will continue to follow.

## 2019-11-22 VITALS — HEART RATE: 80 BPM | TEMPERATURE: 98.4 F | SYSTOLIC BLOOD PRESSURE: 115 MMHG | DIASTOLIC BLOOD PRESSURE: 71 MMHG

## 2019-11-22 VITALS — DIASTOLIC BLOOD PRESSURE: 69 MMHG | SYSTOLIC BLOOD PRESSURE: 106 MMHG | HEART RATE: 133 BPM

## 2019-11-22 VITALS — SYSTOLIC BLOOD PRESSURE: 109 MMHG | DIASTOLIC BLOOD PRESSURE: 75 MMHG | HEART RATE: 52 BPM

## 2019-11-22 VITALS — TEMPERATURE: 98.7 F | HEART RATE: 82 BPM | DIASTOLIC BLOOD PRESSURE: 76 MMHG | SYSTOLIC BLOOD PRESSURE: 112 MMHG

## 2019-11-22 VITALS — DIASTOLIC BLOOD PRESSURE: 74 MMHG | SYSTOLIC BLOOD PRESSURE: 110 MMHG | HEART RATE: 129 BPM

## 2019-11-22 VITALS — SYSTOLIC BLOOD PRESSURE: 118 MMHG | DIASTOLIC BLOOD PRESSURE: 76 MMHG | TEMPERATURE: 98.1 F | HEART RATE: 86 BPM

## 2019-11-22 VITALS — SYSTOLIC BLOOD PRESSURE: 115 MMHG | DIASTOLIC BLOOD PRESSURE: 71 MMHG | HEART RATE: 80 BPM

## 2019-11-22 VITALS — HEART RATE: 100 BPM | DIASTOLIC BLOOD PRESSURE: 82 MMHG | SYSTOLIC BLOOD PRESSURE: 139 MMHG | TEMPERATURE: 98.2 F

## 2019-11-22 VITALS — DIASTOLIC BLOOD PRESSURE: 77 MMHG | HEART RATE: 67 BPM | SYSTOLIC BLOOD PRESSURE: 124 MMHG

## 2019-11-22 VITALS — SYSTOLIC BLOOD PRESSURE: 117 MMHG | HEART RATE: 109 BPM | DIASTOLIC BLOOD PRESSURE: 72 MMHG

## 2019-11-22 VITALS — HEART RATE: 92 BPM | TEMPERATURE: 98.6 F | SYSTOLIC BLOOD PRESSURE: 100 MMHG | DIASTOLIC BLOOD PRESSURE: 74 MMHG

## 2019-11-22 VITALS — HEART RATE: 105 BPM | SYSTOLIC BLOOD PRESSURE: 112 MMHG | DIASTOLIC BLOOD PRESSURE: 69 MMHG

## 2019-11-22 LAB
ALBUMIN SERPL-MCNC: 3.5 GM/DL (ref 3.5–5)
ALP SERPL-CCNC: 97 U/L (ref 50–136)
ALT SERPL-CCNC: 77 U/L (ref 9–52)
ANION GAP SERPL CALC-SCNC: 6 MMOL/L (ref 7–16)
AST SERPL-CCNC: 65 U/L (ref 15–37)
BASOPHILS # BLD: 0 10*3/UL (ref 0–0.2)
BASOPHILS NFR BLD AUTO: 0.1 % (ref 0–2)
BILIRUB SERPL-MCNC: 0.3 MG/DL (ref 0–1)
BUN SERPL-MCNC: 43 MG/DL (ref 7–17)
CALCIUM SERPL-MCNC: 9.4 MG/DL (ref 8.4–10.2)
CHLORIDE SERPL-SCNC: 102 MMOL/L (ref 98–107)
CO2 SERPL-SCNC: 34 MMOL/L (ref 22–30)
CREAT SERPL-SCNC: 0.92 UMOL/L (ref 0.52–1.25)
EOSINOPHIL # BLD: 0 10*3/UL (ref 0–0.7)
EOSINOPHIL NFR BLD: 0 % (ref 0–4)
ERYTHROCYTE [DISTWIDTH] IN BLOOD BY AUTOMATED COUNT: 18.5 % (ref 11.5–14.5)
GLUCOSE SERPL-MCNC: 153 MG/DL (ref 74–106)
GRANULOCYTES # BLD AUTO: 74.9 % (ref 42.2–75.2)
HCT VFR BLD AUTO: 27.8 % (ref 37–47)
HGB BLD-MCNC: 9.6 G/DL (ref 12.5–16)
LYMPHOCYTES # BLD: 2.6 10*3/UL (ref 1.2–3.4)
LYMPHOCYTES NFR BLD: 19.1 % (ref 20–51)
MAGNESIUM SERPL-MCNC: 1.8 MG/DL (ref 1.6–2.3)
MCH RBC QN AUTO: 32 PG (ref 27–31)
MCHC RBC AUTO-ENTMCNC: 35 G/DL (ref 33–37)
MCV RBC AUTO: 92 FL (ref 80–100)
MONOCYTES # BLD: 0.7 10*3/UL (ref 0.1–0.6)
MONOCYTES NFR BLD AUTO: 4.8 % (ref 1.7–9.3)
NEUTROPHILS # BLD: 10.2 10*3/UL (ref 1.4–6.5)
PHOSPHATE SERPL-MCNC: 4.5 MG/DL (ref 2.5–4.5)
PLATELET # BLD AUTO: 399 K/MM3 (ref 130–400)
PMV BLD AUTO: 11.6 FL (ref 7.4–10.4)
POTASSIUM SERPL-SCNC: 4.1 MMOL/L (ref 3.4–5)
PROT SERPL-MCNC: 7.8 GM/DL (ref 6.4–8.2)
RBC # BLD AUTO: 3.03 M/MM3 (ref 4.1–5.3)
SODIUM SERPL-SCNC: 142 MMOL/L (ref 137–145)

## 2019-11-22 NOTE — NUR
Quiet night- Up to bathroom with one assist,gait belt -slow,,, NP0 for
lexiscan, pleasant, states feeling better - did have loose stool x1

## 2019-11-22 NOTE — NUR
Pt resting in bed upon entering room. No c/o at this time, pt is currently NPO
for testing. No further needs identified, will continue to monitor.

## 2019-11-22 NOTE — NUR
Pt in bed at this time on tablet. No c/o pain, n/v, or other needs verbalized.
Primary nurse to resume care at this time.

## 2019-11-22 NOTE — NUR
attended clinical rounds with the team. NELLA contacted Yuniel at
[facility] and requested Yuniel contact patient's PCP, Dr. Mahajan at
[facility] for needed Authorization. Yuniel advised he would contact PCP. NELLA
will continue to follow.

## 2019-11-22 NOTE — NUR
Primary nurse was assisted [facility] [id number] patient care by [facility]
Christa Adkins and [facility] instructor Maryjane Atwood RN-BC.

## 2019-11-23 VITALS — DIASTOLIC BLOOD PRESSURE: 67 MMHG | HEART RATE: 85 BPM | TEMPERATURE: 98.6 F | SYSTOLIC BLOOD PRESSURE: 108 MMHG

## 2019-11-23 VITALS — HEART RATE: 101 BPM | SYSTOLIC BLOOD PRESSURE: 108 MMHG | DIASTOLIC BLOOD PRESSURE: 62 MMHG | TEMPERATURE: 98.1 F

## 2019-11-23 VITALS — HEART RATE: 85 BPM | DIASTOLIC BLOOD PRESSURE: 69 MMHG | SYSTOLIC BLOOD PRESSURE: 114 MMHG | TEMPERATURE: 98.6 F

## 2019-11-23 VITALS — DIASTOLIC BLOOD PRESSURE: 73 MMHG | HEART RATE: 79 BPM | SYSTOLIC BLOOD PRESSURE: 117 MMHG

## 2019-11-23 VITALS — HEART RATE: 69 BPM | TEMPERATURE: 98.4 F | DIASTOLIC BLOOD PRESSURE: 78 MMHG | SYSTOLIC BLOOD PRESSURE: 116 MMHG

## 2019-11-23 VITALS — DIASTOLIC BLOOD PRESSURE: 63 MMHG | SYSTOLIC BLOOD PRESSURE: 113 MMHG | TEMPERATURE: 98.1 F | HEART RATE: 81 BPM

## 2019-11-23 VITALS — TEMPERATURE: 98.5 F

## 2019-11-23 LAB
ANION GAP SERPL CALC-SCNC: 7 MMOL/L (ref 7–16)
BASOPHILS # BLD: 0 10*3/UL (ref 0–0.2)
BASOPHILS NFR BLD AUTO: 0.1 % (ref 0–2)
BUN SERPL-MCNC: 32 MG/DL (ref 7–17)
CALCIUM SERPL-MCNC: 9.8 MG/DL (ref 8.4–10.2)
CHLORIDE SERPL-SCNC: 99 MMOL/L (ref 98–107)
CO2 SERPL-SCNC: 34 MMOL/L (ref 22–30)
CREAT SERPL-SCNC: 0.81 UMOL/L (ref 0.52–1.25)
EOSINOPHIL # BLD: 0 10*3/UL (ref 0–0.7)
EOSINOPHIL NFR BLD: 0.4 % (ref 0–4)
ERYTHROCYTE [DISTWIDTH] IN BLOOD BY AUTOMATED COUNT: 18.5 % (ref 11.5–14.5)
GLUCOSE SERPL-MCNC: 143 MG/DL (ref 74–106)
GRANULOCYTES # BLD AUTO: 58.2 % (ref 42.2–75.2)
HCT VFR BLD AUTO: 28.9 % (ref 37–47)
HGB BLD-MCNC: 9.9 G/DL (ref 12.5–16)
LYMPHOCYTES # BLD: 3.2 10*3/UL (ref 1.2–3.4)
LYMPHOCYTES NFR BLD: 33.4 % (ref 20–51)
MCH RBC QN AUTO: 31 PG (ref 27–31)
MCHC RBC AUTO-ENTMCNC: 34 G/DL (ref 33–37)
MCV RBC AUTO: 92 FL (ref 80–100)
MONOCYTES # BLD: 0.7 10*3/UL (ref 0.1–0.6)
MONOCYTES NFR BLD AUTO: 7.3 % (ref 1.7–9.3)
NEUTROPHILS # BLD: 5.6 10*3/UL (ref 1.4–6.5)
PLATELET # BLD AUTO: 434 K/MM3 (ref 130–400)
PMV BLD AUTO: 11.1 FL (ref 7.4–10.4)
POTASSIUM SERPL-SCNC: 4.5 MMOL/L (ref 3.4–5)
RBC # BLD AUTO: 3.16 M/MM3 (ref 4.1–5.3)
SODIUM SERPL-SCNC: 139 MMOL/L (ref 137–145)

## 2019-11-23 NOTE — NUR
Patient resting in bed upon arrival to the room. Denies pain. Patient on
contact precautions for C. Diff. Noted to have a cough and patient states it
is productive, but no sputum noted in suction canister. Noted to require
nectar thick liquids and tolerates this well. Antibiotics given per orders.
PICC to SANDRA flushes with ease. Blood returned. Patient requested and recieved
a shower this evening. Linn continues to be present and draining clear,
yellow urine. Requires 1 assist from staff for ambulation and toileting.
Lotion applied to patient. Denies any further needs. Will continue to monitor.

## 2019-11-23 NOTE — NUR
PT IS AWAKE, ALERT, OX3 AND PLEASANT. VOICE IS HOARSE D/T RECENT INTUBATION.
PT DENIES PAIN. PT DENIES ANY DIARRHEA X2 DAYS NOW. PT AMBULATES INDEPENDENTLY
WITHIN HER ROOM WITHOUT DIFFCIULTIES. PT REPORTS VOIDING WITHOUS ISSUES. CALL
LIGHT WITHIN REACH.

## 2019-11-24 VITALS — HEART RATE: 85 BPM | DIASTOLIC BLOOD PRESSURE: 64 MMHG | TEMPERATURE: 99.4 F | SYSTOLIC BLOOD PRESSURE: 107 MMHG

## 2019-11-24 VITALS — HEART RATE: 99 BPM | DIASTOLIC BLOOD PRESSURE: 41 MMHG | SYSTOLIC BLOOD PRESSURE: 109 MMHG | TEMPERATURE: 98.4 F

## 2019-11-24 VITALS — TEMPERATURE: 98.3 F | SYSTOLIC BLOOD PRESSURE: 110 MMHG | DIASTOLIC BLOOD PRESSURE: 69 MMHG | HEART RATE: 76 BPM

## 2019-11-24 VITALS — HEART RATE: 88 BPM | TEMPERATURE: 98.2 F | SYSTOLIC BLOOD PRESSURE: 94 MMHG | DIASTOLIC BLOOD PRESSURE: 59 MMHG

## 2019-11-24 VITALS — DIASTOLIC BLOOD PRESSURE: 70 MMHG | SYSTOLIC BLOOD PRESSURE: 118 MMHG | TEMPERATURE: 98.8 F | HEART RATE: 99 BPM

## 2019-11-24 VITALS — SYSTOLIC BLOOD PRESSURE: 106 MMHG | DIASTOLIC BLOOD PRESSURE: 67 MMHG | TEMPERATURE: 98 F | HEART RATE: 78 BPM

## 2019-11-24 VITALS — TEMPERATURE: 98.4 F | DIASTOLIC BLOOD PRESSURE: 71 MMHG | SYSTOLIC BLOOD PRESSURE: 120 MMHG | HEART RATE: 76 BPM

## 2019-11-24 VITALS — DIASTOLIC BLOOD PRESSURE: 64 MMHG | TEMPERATURE: 97.9 F | SYSTOLIC BLOOD PRESSURE: 104 MMHG | HEART RATE: 89 BPM

## 2019-11-24 NOTE — NUR
Assessment complete. Alert and oriented. Pt laying comfortably in bed upon
entry.  Denies any pain or discomfort at this time. Pt ambulatory to/from
bathroom. Non-productive cough noted, pt use yaunker for excess saliva. SANDRA
PICC dressing CDI, red port patent and flushed, purple port unable to flush,
will endorse to next shift. Pt states having formed stools, last BM earlier
this evening. No diarrhea.Medication administered as ordered. Needs attended
to at this time. Call light within reach. Contact precautions in place.

## 2019-11-24 NOTE — NUR
PT HAD A GOOD NIGHT, SLEPT INTERMITTENTLY. PT DENIES PAIN OR OTHER ISSUES AT
THIS TIME. PT IS SITTING UP IN CHAIR AT BEDSIDE. VOICE REMAINS SLIGHTLY HOARSE
BUT HAS BEEN IMPROVING. NOTED INTERMITTENT COUGH WITH NO PRODUCTION, PT USES
SUCTION PRN. CALL LIGHT WITHIN REACH.

## 2019-11-24 NOTE — NUR
PATIENT ASSESSMENT COMPLETED. SHE REPORTS STOOLS ARE FORMED. PURPLE LINE ON
THE PICC I AM UNABLE TO FLUSH AT THIS TIME. SHE DOES HAVE A DRY COUGH AND
REQUESTS ORANGE JUICE. THIS WAS PROVIDED. PT ARRIVED TO TAKE HER FOR A WALK IN
THE HALLWAYS.

## 2019-11-24 NOTE — NUR
PT AMBULATED WITH USE OF WALKER, GAIT BELT AND SBA FROM PT ROOM TO IPR AND
BACK TO ROOM. PT DENIES SOB. CALL LIGHT WITHN REACH.

## 2019-11-25 VITALS — DIASTOLIC BLOOD PRESSURE: 65 MMHG | TEMPERATURE: 98.2 F | HEART RATE: 89 BPM | SYSTOLIC BLOOD PRESSURE: 105 MMHG

## 2019-11-25 VITALS — TEMPERATURE: 98.2 F | DIASTOLIC BLOOD PRESSURE: 73 MMHG | HEART RATE: 73 BPM | SYSTOLIC BLOOD PRESSURE: 115 MMHG

## 2019-11-25 VITALS — TEMPERATURE: 98.7 F | HEART RATE: 78 BPM | SYSTOLIC BLOOD PRESSURE: 117 MMHG | DIASTOLIC BLOOD PRESSURE: 71 MMHG

## 2019-11-25 VITALS — DIASTOLIC BLOOD PRESSURE: 59 MMHG | TEMPERATURE: 98.9 F | HEART RATE: 81 BPM | SYSTOLIC BLOOD PRESSURE: 100 MMHG

## 2019-11-25 VITALS — DIASTOLIC BLOOD PRESSURE: 65 MMHG | SYSTOLIC BLOOD PRESSURE: 105 MMHG | HEART RATE: 93 BPM | TEMPERATURE: 98.1 F

## 2019-11-25 VITALS — SYSTOLIC BLOOD PRESSURE: 114 MMHG | DIASTOLIC BLOOD PRESSURE: 71 MMHG | HEART RATE: 100 BPM | TEMPERATURE: 98.6 F

## 2019-11-25 VITALS — DIASTOLIC BLOOD PRESSURE: 74 MMHG | TEMPERATURE: 97.6 F | HEART RATE: 75 BPM | SYSTOLIC BLOOD PRESSURE: 115 MMHG

## 2019-11-25 LAB
ALBUMIN SERPL-MCNC: 3.7 GM/DL (ref 3.5–5)
ALP SERPL-CCNC: 76 U/L (ref 50–136)
ALT SERPL-CCNC: 84 U/L (ref 9–52)
ANION GAP SERPL CALC-SCNC: 6 MMOL/L (ref 7–16)
AST SERPL-CCNC: 58 U/L (ref 15–37)
BASOPHILS # BLD: 0 10*3/UL (ref 0–0.2)
BASOPHILS NFR BLD AUTO: 0.1 % (ref 0–2)
BILIRUB SERPL-MCNC: 0.4 MG/DL (ref 0–1)
BUN SERPL-MCNC: 22 MG/DL (ref 7–17)
CALCIUM SERPL-MCNC: 9.5 MG/DL (ref 8.4–10.2)
CHLORIDE SERPL-SCNC: 100 MMOL/L (ref 98–107)
CO2 SERPL-SCNC: 31 MMOL/L (ref 22–30)
CREAT SERPL-SCNC: 0.86 UMOL/L (ref 0.52–1.25)
EOSINOPHIL # BLD: 0.1 10*3/UL (ref 0–0.7)
EOSINOPHIL NFR BLD: 1.5 % (ref 0–4)
ERYTHROCYTE [DISTWIDTH] IN BLOOD BY AUTOMATED COUNT: 18.2 % (ref 11.5–14.5)
GLUCOSE SERPL-MCNC: 108 MG/DL (ref 74–106)
GRANULOCYTES # BLD AUTO: 43.5 % (ref 42.2–75.2)
HCT VFR BLD AUTO: 27.5 % (ref 37–47)
HGB BLD-MCNC: 9.8 G/DL (ref 12.5–16)
LYMPHOCYTES # BLD: 4.1 10*3/UL (ref 1.2–3.4)
LYMPHOCYTES NFR BLD: 43.7 % (ref 20–51)
MAGNESIUM SERPL-MCNC: 2.1 MG/DL (ref 1.6–2.3)
MCH RBC QN AUTO: 32 PG (ref 27–31)
MCHC RBC AUTO-ENTMCNC: 36 G/DL (ref 33–37)
MCV RBC AUTO: 91 FL (ref 80–100)
MONOCYTES # BLD: 1 10*3/UL (ref 0.1–0.6)
MONOCYTES NFR BLD AUTO: 10.9 % (ref 1.7–9.3)
NEUTROPHILS # BLD: 4.1 10*3/UL (ref 1.4–6.5)
PHOSPHATE SERPL-MCNC: 4.1 MG/DL (ref 2.5–4.5)
PLATELET # BLD AUTO: 357 K/MM3 (ref 130–400)
PMV BLD AUTO: 10.7 FL (ref 7.4–10.4)
POTASSIUM SERPL-SCNC: 4.6 MMOL/L (ref 3.4–5)
PRE ALBUMIN: 33.3 MG/DL (ref 17.6–36)
PROT SERPL-MCNC: 7.9 GM/DL (ref 6.4–8.2)
RBC # BLD AUTO: 3.02 M/MM3 (ref 4.1–5.3)
SODIUM SERPL-SCNC: 137 MMOL/L (ref 137–145)

## 2019-11-25 NOTE — NUR
Jazmyne, IPR Director, reports that she is checking insurance. Yuniel, at [facility], reports they have spoken to the patient's PCP and that they
are sending in auth for . NELLA met with the patient to inform. The
patient reports that she still is wanting to pursue post-acute rehab and does
not feel comfortable returning home yet. SW awaiting approval from insurance.

## 2019-11-25 NOTE — NUR
Assessment complete. Alert and oriented. Denies any pain or discomfort at this
time. Present with dry non-productive cough. Medications administered as
ordered. SANDRA PICC patent, flushed, dressing CDI. Afebrile. Pt states having
formed stools. Last BM earlier today. No diarrhea. Needs attended too. Call
light within reach.

## 2019-11-25 NOTE — NUR
with sterile technique right upper arm PICC dressing change done with
insertion site cleansed with ChloraPrep 1, chlorhexidine impregnated disc
applied, skin prep, StatLock, and Tegaderm applied.  No signs or symptoms of
IV complications noted.  No concerns voiced.  Arm wrapped with Ace to protect
catheter.

## 2019-11-25 NOTE — NUR
Pt slept well during this shift. Medications administered as ordered. Needs
attended too. Standby assist when walking with pt around floor. Re-educated pt
on use of IS. SCD in place. Contact precautions remained in place. Call light
within reach.

## 2019-11-25 NOTE — NUR
Pt has done very well today, up to bathroom independently, steady gate. Feels
well, continues to cough but not expectorating anything.  Moved to new room
d/t removal of precautions for formed stool.  Resting quietly in bed at this
time with family at bedside. Denies needs, will give bedside shift report to
nightshift nurse who will resume care.

## 2019-11-25 NOTE — NUR
Bedside shift report received from SAM Romero. Pt in bed resting with eyes
open, denies needs, will continue to monitor.

## 2019-11-25 NOTE — NUR
Assessment charted. PT continues to have dry cough that is not improving, per
ST will have swallow test today.  PICC to SANDRA, flushes well and has good blood
return.  Pt denies any pain. Discussed need to reduce orange juice intake due
to high acidity.  Pt agreeable to this.  Dietician in to talk with pt as well
about other options.  Resting quietly, denies needs, breathing is shallow,
re-educated on uses of IS.  Will continue to monitor.

## 2019-11-26 VITALS — SYSTOLIC BLOOD PRESSURE: 120 MMHG | HEART RATE: 79 BPM | TEMPERATURE: 98.2 F | DIASTOLIC BLOOD PRESSURE: 70 MMHG

## 2019-11-26 VITALS — SYSTOLIC BLOOD PRESSURE: 109 MMHG | DIASTOLIC BLOOD PRESSURE: 75 MMHG | HEART RATE: 81 BPM | TEMPERATURE: 98.5 F

## 2019-11-26 VITALS — TEMPERATURE: 98.6 F | SYSTOLIC BLOOD PRESSURE: 107 MMHG | HEART RATE: 97 BPM | DIASTOLIC BLOOD PRESSURE: 75 MMHG

## 2019-11-26 LAB
ANION GAP SERPL CALC-SCNC: 7 MMOL/L (ref 7–16)
BUN SERPL-MCNC: 21 MG/DL (ref 7–17)
CALCIUM SERPL-MCNC: 9.4 MG/DL (ref 8.4–10.2)
CHLORIDE SERPL-SCNC: 102 MMOL/L (ref 98–107)
CO2 SERPL-SCNC: 28 MMOL/L (ref 22–30)
CREAT SERPL-SCNC: 0.86 UMOL/L (ref 0.52–1.25)
GLUCOSE SERPL-MCNC: 108 MG/DL (ref 74–106)
POTASSIUM SERPL-SCNC: 4.5 MMOL/L (ref 3.4–5)
SODIUM SERPL-SCNC: 137 MMOL/L (ref 137–145)

## 2019-11-26 NOTE — NUR
NELLA attended clinical rounds. The hospitalist discussed the patient's
improvement with therapy and being too functional for IPR. The patient is in
in agreeance to this and feels comfortable returning home with her . NELLA
then followed up with the patient. The patient reports that she does feel
comfortable returning home. NELLA discussed home health vs outpatient therapy.
The patient reports that she would prefer home health. NELLA presented the
patient with [employer]'s list of home health agencies that serve [place].
The patient chose [facility]. NELLA contacted and faxed a referral to Lizzy at
[facility]. SW awaiting their screen. The patient is also interested in
getting a FWW. NELLA presented and explained the Patient Choice Form for DME. The
patient chose [employer] [employer]. NELLA contacted Raymond at [facility].
Raymond reports that they do not bill . NELLA informed the patient and then
she chose [facility]. NELLA contacted and faxed the patient's FWW
to Meredith at [facility]. Awaiting delivery of walker.
 
NELLA updated Yuniel at [facility].

## 2019-11-26 NOTE — NUR
Lizzy, at [facility], contacted NELLA to inform that they were able to resolve
their therapy issue and are able to accept the patient for services. NELLA
informed the patient of this and she would like to switch back to [facility].
NELLA notified Arpan at [facility].
 
The patient is to discharge back home with her  today, 11/26, with home
health services for skilled nursing/PT/OT/ST through [facility]. No
additional needs at this time.

## 2019-11-26 NOTE — NUR
Lizyz, at [facility], reports that  requires certified PT/OT
therapists and that they do not have that available right now. [facility] bills Marly and has PT/OT/ST/skilled nursing. NELLA met with the
patient to update and inform. The patient reports that she would be agreeable
to [facility]. NELLA contacted and faxed a referral to Arpan at
[facility]. Arpan reports that they would need to get authorization,
but would try to see the patient. NELLA informed the patient of this and she
would like to pursue with their services.
 
The patient is to discharge back home with her  today, 11/26, and home
health services for skilled nursing/PT/OT/ST through [facility]. NELLA
faxed the patient's discharge orders to Arpan at [facility]. No
additional needs at this time.

## 2019-11-26 NOTE — NUR
PT UP AMBULATING IN HALLS WITH SBA X1. PLAN ON DISCHARGE TODAY. PICC LINE TO
BE REMOVED PRIOR TO DISCHARGE.

## 2019-11-26 NOTE — NUR
Pt had difficulty sleeping through the night. States she would sleep 2 hours
and be awake for a few hours then back asleep. Walked with pt on the unit
using walker. Medications administered as ordered. Needs met. Call light
within reach.

## 2019-11-27 ENCOUNTER — HOSPITAL ENCOUNTER (EMERGENCY)
Dept: HOSPITAL 19 - COL.ER | Age: 59
Discharge: ANOTHER HOSPITAL | End: 2019-11-27
Payer: OTHER GOVERNMENT

## 2019-11-27 VITALS — SYSTOLIC BLOOD PRESSURE: 101 MMHG | HEART RATE: 141 BPM | TEMPERATURE: 102.3 F | DIASTOLIC BLOOD PRESSURE: 62 MMHG

## 2019-11-27 VITALS — BODY MASS INDEX: 23.53 KG/M2 | WEIGHT: 146.39 LBS | HEIGHT: 65.98 IN

## 2019-11-27 DIAGNOSIS — A41.9: ICD-10-CM

## 2019-11-27 DIAGNOSIS — E16.2: Primary | ICD-10-CM

## 2019-11-27 DIAGNOSIS — Z85.79: ICD-10-CM

## 2019-11-27 DIAGNOSIS — Z90.710: ICD-10-CM

## 2019-11-27 DIAGNOSIS — D64.9: ICD-10-CM

## 2019-11-27 DIAGNOSIS — Z79.82: ICD-10-CM

## 2019-11-27 DIAGNOSIS — Z90.49: ICD-10-CM

## 2019-11-27 DIAGNOSIS — I10: ICD-10-CM

## 2019-11-27 LAB
ALBUMIN SERPL-MCNC: 3.8 GM/DL (ref 3.5–5)
ALP SERPL-CCNC: 105 U/L (ref 50–136)
ALT SERPL-CCNC: 91 U/L (ref 9–52)
ANION GAP SERPL CALC-SCNC: 10 MMOL/L (ref 7–16)
ANISOCYTOSIS BLD QL: (no result)
AST SERPL-CCNC: 81 U/L (ref 15–37)
BILIRUB SERPL-MCNC: 0.5 MG/DL (ref 0–1)
BUN SERPL-MCNC: 22 MG/DL (ref 7–17)
CALCIUM SERPL-MCNC: 9.6 MG/DL (ref 8.4–10.2)
CHLORIDE SERPL-SCNC: 103 MMOL/L (ref 98–107)
CO2 SERPL-SCNC: 23 MMOL/L (ref 22–30)
CREAT SERPL-SCNC: 1.08 UMOL/L (ref 0.52–1.25)
ERYTHROCYTE [DISTWIDTH] IN BLOOD BY AUTOMATED COUNT: 18.3 % (ref 11.5–14.5)
GLUCOSE SERPL-MCNC: 96 MG/DL (ref 74–106)
HCT VFR BLD AUTO: 28.5 % (ref 37–47)
HGB BLD-MCNC: 10.2 G/DL (ref 12.5–16)
INR BLD: 1.4 (ref 0.8–3)
MCH RBC QN AUTO: 32 PG (ref 27–31)
MCHC RBC AUTO-ENTMCNC: 36 G/DL (ref 33–37)
MCV RBC AUTO: 91 FL (ref 80–100)
NEUTS BAND NFR BLD: 2 % (ref 0–10)
NEUTS SEG NFR BLD MANUAL: 98 % (ref 42–75.2)
PLATELET # BLD AUTO: 278 K/MM3 (ref 130–400)
PLATELET BLD QL SMEAR: NORMAL
PMV BLD AUTO: 10.8 FL (ref 7.4–10.4)
POTASSIUM SERPL-SCNC: 4.3 MMOL/L (ref 3.4–5)
PROT SERPL-MCNC: 7.8 GM/DL (ref 6.4–8.2)
PROTHROMBIN TIME: 16.1 SECONDS (ref 9.7–12.8)
RBC # BLD AUTO: 3.15 M/MM3 (ref 4.1–5.3)
SODIUM SERPL-SCNC: 136 MMOL/L (ref 137–145)
TARGETS BLD QL SMEAR: (no result)
TROPONIN I SERPL-MCNC: 0.07 NG/ML (ref 0–0.04)

## 2019-11-27 PROCEDURE — C1751 CATH, INF, PER/CENT/MIDLINE: HCPCS

## 2020-01-29 ENCOUNTER — HOSPITAL ENCOUNTER (OUTPATIENT)
Dept: HOSPITAL 19 - WSST | Age: 60
LOS: 69 days | Discharge: HOME | End: 2020-04-07
Payer: OTHER GOVERNMENT

## 2020-01-29 DIAGNOSIS — R47.1: Primary | ICD-10-CM

## 2020-01-29 DIAGNOSIS — C90.00: ICD-10-CM

## 2020-01-29 DIAGNOSIS — Z99.11: ICD-10-CM

## 2020-10-23 ENCOUNTER — HOSPITAL ENCOUNTER (OUTPATIENT)
Dept: HOSPITAL 19 - COL.VAS | Age: 60
End: 2020-10-23
Attending: INTERNAL MEDICINE
Payer: OTHER GOVERNMENT

## 2020-10-23 DIAGNOSIS — R06.02: Primary | ICD-10-CM

## 2021-02-25 NOTE — NUR
Tolerating extubated status without issue.  at bedside. Patient is
calm, coughing up thick white secretions. Uses suction wand to remove from
mouth.
 
SPO2 100% on 8L Oxymask. RR mid 20s. [facility] has been successfully processed, faxed to [facility].  JONH Nieves

## 2023-06-03 NOTE — NUR
Injection Aftercare: Right first [facility] cortisone injection    Please contact Dr Sheikh Speak office with questions or concerns at [phone] [facility]) or [phone] ([place])    Recurring Pain:  Injections are usually done using a local anesthetic such as lidocaine and cortisone. Lidocaine gives an immediate effects with the numbing effect usually lasting about 1-2 hours. The cortisone usually takes 24-48 hours to take effect. Rest the Area:  Be careful with the affected area or joint, usually the injected medication causes the area to feel numb. Because you may not feel the pain, the first 72 hours post injection it is very easy to cause injury to the area. Mild essential activities for the next 2 weeks. Watch of Infection:  Although every precaution has been taken to prevent infection, be alert for the following signs: Fever above 100 degrees, increased warmth and redness at the site of injection, and/or swelling of the area. If any of these symptoms develop, call our office immediately. Remove bandage at bedtime, if bandage comes off - no need to apply another. Apply ice to the area every 4 hours for 20 minutes at a time for 2 days. Make sure there is a barrier between the ice pack and skin. REPORT RECEIVED FROM SAM KHAN; CARE OF PT ASSUMED AT THIS TIME. BEDSIDE
ROUNDS COMPLETED, ALL NEEDS MET. CALL LIGHT WITHIN REACH. Patient requests all Lab, Cardiology, and Radiology Results on their Discharge Instructions

## 2024-10-07 ENCOUNTER — HOSPITAL ENCOUNTER (OUTPATIENT)
Dept: HOSPITAL 19 - MC.RAD | Age: 64
End: 2024-10-07
Payer: MEDICARE

## 2024-10-07 DIAGNOSIS — Z12.31: Primary | ICD-10-CM
